# Patient Record
Sex: FEMALE | Race: WHITE | Employment: UNEMPLOYED | URBAN - METROPOLITAN AREA
[De-identification: names, ages, dates, MRNs, and addresses within clinical notes are randomized per-mention and may not be internally consistent; named-entity substitution may affect disease eponyms.]

---

## 2017-08-08 PROBLEM — Z94.4 LIVER REPLACED BY TRANSPLANT (HCC): Status: ACTIVE | Noted: 2017-08-08

## 2017-08-08 PROBLEM — E06.3 HASHIMOTO'S THYROIDITIS: Status: ACTIVE | Noted: 2017-08-08

## 2017-08-08 PROBLEM — Z29.8 NEED FOR PROPHYLACTIC IMMUNOTHERAPY: Status: ACTIVE | Noted: 2017-08-08

## 2018-10-18 ENCOUNTER — HOSPITAL ENCOUNTER (OUTPATIENT)
Dept: LAB | Age: 38
Discharge: HOME | End: 2018-10-18
Payer: MEDICARE

## 2018-10-18 LAB
ALBUMIN SERPL-MCNC: 3.8 G/DL (ref 3.5–5)
ALBUMIN/GLOB SERPL: 0.8 {RATIO} (ref 1.2–3.5)
ALP SERPL-CCNC: 169 U/L (ref 50–136)
ALT SERPL-CCNC: 149 U/L (ref 12–65)
ANION GAP SERPL CALC-SCNC: 9 MMOL/L (ref 7–16)
AST SERPL-CCNC: 344 U/L (ref 15–37)
BASOPHILS # BLD: 0 K/UL (ref 0–0.2)
BASOPHILS NFR BLD: 1 % (ref 0–2)
BILIRUB SERPL-MCNC: 2 MG/DL (ref 0.2–1.1)
BUN SERPL-MCNC: 13 MG/DL (ref 6–23)
CALCIUM SERPL-MCNC: 9.2 MG/DL (ref 8.3–10.4)
CHLORIDE SERPL-SCNC: 107 MMOL/L (ref 98–107)
CO2 SERPL-SCNC: 25 MMOL/L (ref 21–32)
CREAT SERPL-MCNC: 1.07 MG/DL (ref 0.6–1)
DIFFERENTIAL METHOD BLD: ABNORMAL
EOSINOPHIL # BLD: 0.2 K/UL (ref 0–0.8)
EOSINOPHIL NFR BLD: 2 % (ref 0.5–7.8)
ERYTHROCYTE [DISTWIDTH] IN BLOOD BY AUTOMATED COUNT: 13.8 %
GLOBULIN SER CALC-MCNC: 4.9 G/DL (ref 2.3–3.5)
GLUCOSE SERPL-MCNC: 95 MG/DL (ref 65–100)
HCT VFR BLD AUTO: 43.9 % (ref 35.8–46.3)
HGB BLD-MCNC: 14.2 G/DL (ref 11.7–15.4)
IMM GRANULOCYTES # BLD: 0 K/UL (ref 0–0.5)
IMM GRANULOCYTES NFR BLD AUTO: 0 % (ref 0–5)
INR PPP: 1.2
LYMPHOCYTES # BLD: 1.4 K/UL (ref 0.5–4.6)
LYMPHOCYTES NFR BLD: 22 % (ref 13–44)
MCH RBC QN AUTO: 36 PG (ref 26.1–32.9)
MCHC RBC AUTO-ENTMCNC: 32.3 G/DL (ref 31.4–35)
MCV RBC AUTO: 111.4 FL (ref 79.6–97.8)
MONOCYTES # BLD: 0.8 K/UL (ref 0.1–1.3)
MONOCYTES NFR BLD: 12 % (ref 4–12)
NEUTS SEG # BLD: 3.9 K/UL (ref 1.7–8.2)
NEUTS SEG NFR BLD: 62 % (ref 43–78)
NRBC # BLD: 0 K/UL (ref 0–0.2)
PLATELET # BLD AUTO: 158 K/UL (ref 150–450)
PMV BLD AUTO: 11.1 FL (ref 9.4–12.3)
POTASSIUM SERPL-SCNC: 4.5 MMOL/L (ref 3.5–5.1)
PROT SERPL-MCNC: 8.7 G/DL (ref 6.3–8.2)
PROTHROMBIN TIME: 14.3 SEC (ref 11.5–14.5)
RBC # BLD AUTO: 3.94 M/UL (ref 4.05–5.2)
SODIUM SERPL-SCNC: 141 MMOL/L (ref 136–145)
WBC # BLD AUTO: 6.3 K/UL (ref 4.3–11.1)

## 2018-10-18 PROCEDURE — 85025 COMPLETE CBC W/AUTO DIFF WBC: CPT

## 2018-10-18 PROCEDURE — 80197 ASSAY OF TACROLIMUS: CPT

## 2018-10-18 PROCEDURE — 80053 COMPREHEN METABOLIC PANEL: CPT

## 2018-10-18 PROCEDURE — 85610 PROTHROMBIN TIME: CPT

## 2018-10-18 PROCEDURE — 36415 COLL VENOUS BLD VENIPUNCTURE: CPT

## 2018-10-19 LAB — TACROLIMUS BLD-MCNC: 5.8 NG/ML (ref 2–20)

## 2019-10-16 ENCOUNTER — APPOINTMENT (OUTPATIENT)
Dept: GENERAL RADIOLOGY | Age: 39
End: 2019-10-16
Attending: EMERGENCY MEDICINE
Payer: MEDICARE

## 2019-10-16 ENCOUNTER — HOSPITAL ENCOUNTER (EMERGENCY)
Age: 39
Discharge: HOSPICE | End: 2019-10-16
Attending: EMERGENCY MEDICINE
Payer: MEDICARE

## 2019-10-16 ENCOUNTER — APPOINTMENT (OUTPATIENT)
Dept: CT IMAGING | Age: 39
End: 2019-10-16
Attending: EMERGENCY MEDICINE
Payer: MEDICARE

## 2019-10-16 ENCOUNTER — HOSPITAL ENCOUNTER (INPATIENT)
Age: 39
LOS: 3 days | Discharge: HOME | DRG: 314 | End: 2019-10-19
Attending: INTERNAL MEDICINE | Admitting: HOSPITALIST
Payer: MEDICARE

## 2019-10-16 VITALS
RESPIRATION RATE: 18 BRPM | BODY MASS INDEX: 31.39 KG/M2 | TEMPERATURE: 98.3 F | DIASTOLIC BLOOD PRESSURE: 57 MMHG | SYSTOLIC BLOOD PRESSURE: 95 MMHG | HEIGHT: 67 IN | OXYGEN SATURATION: 96 % | HEART RATE: 91 BPM | WEIGHT: 200 LBS

## 2019-10-16 DIAGNOSIS — D63.1 ANEMIA DUE TO CHRONIC KIDNEY DISEASE, ON CHRONIC DIALYSIS (HCC): ICD-10-CM

## 2019-10-16 DIAGNOSIS — J90 PLEURAL EFFUSION: ICD-10-CM

## 2019-10-16 DIAGNOSIS — R51.9 SEVERE HEADACHE: ICD-10-CM

## 2019-10-16 DIAGNOSIS — Z99.2 ANEMIA DUE TO CHRONIC KIDNEY DISEASE, ON CHRONIC DIALYSIS (HCC): ICD-10-CM

## 2019-10-16 DIAGNOSIS — N18.6 ANEMIA DUE TO CHRONIC KIDNEY DISEASE, ON CHRONIC DIALYSIS (HCC): ICD-10-CM

## 2019-10-16 DIAGNOSIS — I95.9 HYPOTENSION, UNSPECIFIED HYPOTENSION TYPE: Primary | ICD-10-CM

## 2019-10-16 PROBLEM — E16.2 HYPOGLYCEMIA: Status: ACTIVE | Noted: 2019-10-16

## 2019-10-16 LAB
ALBUMIN SERPL-MCNC: 3.4 G/DL (ref 3.5–5)
ALBUMIN/GLOB SERPL: 1.1 {RATIO} (ref 1.2–3.5)
ALP SERPL-CCNC: 173 U/L (ref 50–130)
ALT SERPL-CCNC: 14 U/L (ref 12–65)
ANION GAP SERPL CALC-SCNC: 12 MMOL/L (ref 7–16)
AST SERPL-CCNC: 38 U/L (ref 15–37)
ATRIAL RATE: 85 BPM
BASOPHILS # BLD: 0 K/UL (ref 0–0.2)
BASOPHILS NFR BLD: 1 % (ref 0–2)
BILIRUB SERPL-MCNC: 1.9 MG/DL (ref 0.2–1.1)
BUN SERPL-MCNC: 19 MG/DL (ref 6–23)
CALCIUM SERPL-MCNC: 8.9 MG/DL (ref 8.3–10.4)
CALCULATED P AXIS, ECG09: 40 DEGREES
CALCULATED R AXIS, ECG10: 63 DEGREES
CALCULATED T AXIS, ECG11: 60 DEGREES
CHLORIDE SERPL-SCNC: 98 MMOL/L (ref 98–107)
CO2 SERPL-SCNC: 25 MMOL/L (ref 21–32)
CREAT SERPL-MCNC: 5.66 MG/DL (ref 0.6–1)
DIAGNOSIS, 93000: NORMAL
DIFFERENTIAL METHOD BLD: ABNORMAL
EOSINOPHIL # BLD: 0 K/UL (ref 0–0.8)
EOSINOPHIL NFR BLD: 1 % (ref 0.5–7.8)
ERYTHROCYTE [DISTWIDTH] IN BLOOD BY AUTOMATED COUNT: 15.6 % (ref 11.9–14.6)
GLOBULIN SER CALC-MCNC: 3.1 G/DL (ref 2.3–3.5)
GLUCOSE BLD STRIP.AUTO-MCNC: 128 MG/DL (ref 65–100)
GLUCOSE BLD STRIP.AUTO-MCNC: 150 MG/DL (ref 65–100)
GLUCOSE BLD STRIP.AUTO-MCNC: 60 MG/DL (ref 65–100)
GLUCOSE SERPL-MCNC: 160 MG/DL (ref 65–100)
HCT VFR BLD AUTO: 26.8 % (ref 35.8–46.3)
HGB BLD-MCNC: 8 G/DL (ref 11.7–15.4)
IMM GRANULOCYTES # BLD AUTO: 0 K/UL (ref 0–0.5)
IMM GRANULOCYTES NFR BLD AUTO: 0 % (ref 0–5)
LIPASE SERPL-CCNC: 109 U/L (ref 73–393)
LYMPHOCYTES # BLD: 1.3 K/UL (ref 0.5–4.6)
LYMPHOCYTES NFR BLD: 41 % (ref 13–44)
MAGNESIUM SERPL-MCNC: 2.3 MG/DL (ref 1.8–2.4)
MCH RBC QN AUTO: 28 PG (ref 26.1–32.9)
MCHC RBC AUTO-ENTMCNC: 29.9 G/DL (ref 31.4–35)
MCV RBC AUTO: 93.7 FL (ref 79.6–97.8)
MONOCYTES # BLD: 0.2 K/UL (ref 0.1–1.3)
MONOCYTES NFR BLD: 7 % (ref 4–12)
NEUTS SEG # BLD: 1.5 K/UL (ref 1.7–8.2)
NEUTS SEG NFR BLD: 49 % (ref 43–78)
NRBC # BLD: 0 K/UL (ref 0–0.2)
P-R INTERVAL, ECG05: 140 MS
PHOSPHATE SERPL-MCNC: 4.5 MG/DL (ref 2.5–4.5)
PLATELET # BLD AUTO: 112 K/UL (ref 150–450)
PMV BLD AUTO: 12 FL (ref 9.4–12.3)
POTASSIUM SERPL-SCNC: 3.7 MMOL/L (ref 3.5–5.1)
PROT SERPL-MCNC: 6.5 G/DL (ref 6.3–8.2)
Q-T INTERVAL, ECG07: 434 MS
QRS DURATION, ECG06: 82 MS
QTC CALCULATION (BEZET), ECG08: 516 MS
RBC # BLD AUTO: 2.86 M/UL (ref 4.05–5.2)
SODIUM SERPL-SCNC: 135 MMOL/L (ref 136–145)
VENTRICULAR RATE, ECG03: 85 BPM
WBC # BLD AUTO: 3.1 K/UL (ref 4.3–11.1)

## 2019-10-16 PROCEDURE — 74011250637 HC RX REV CODE- 250/637: Performed by: EMERGENCY MEDICINE

## 2019-10-16 PROCEDURE — 99285 EMERGENCY DEPT VISIT HI MDM: CPT | Performed by: EMERGENCY MEDICINE

## 2019-10-16 PROCEDURE — 83735 ASSAY OF MAGNESIUM: CPT

## 2019-10-16 PROCEDURE — 82962 GLUCOSE BLOOD TEST: CPT

## 2019-10-16 PROCEDURE — 99285 EMERGENCY DEPT VISIT HI MDM: CPT

## 2019-10-16 PROCEDURE — 80053 COMPREHEN METABOLIC PANEL: CPT

## 2019-10-16 PROCEDURE — 83690 ASSAY OF LIPASE: CPT

## 2019-10-16 PROCEDURE — 96361 HYDRATE IV INFUSION ADD-ON: CPT | Performed by: EMERGENCY MEDICINE

## 2019-10-16 PROCEDURE — 70450 CT HEAD/BRAIN W/O DYE: CPT

## 2019-10-16 PROCEDURE — 74011000250 HC RX REV CODE- 250: Performed by: INTERNAL MEDICINE

## 2019-10-16 PROCEDURE — 77030020256 HC SOL INJ NACL 0.9%  500ML

## 2019-10-16 PROCEDURE — 85025 COMPLETE CBC W/AUTO DIFF WBC: CPT

## 2019-10-16 PROCEDURE — P9045 ALBUMIN (HUMAN), 5%, 250 ML: HCPCS | Performed by: HOSPITALIST

## 2019-10-16 PROCEDURE — 96374 THER/PROPH/DIAG INJ IV PUSH: CPT | Performed by: EMERGENCY MEDICINE

## 2019-10-16 PROCEDURE — 77030019605

## 2019-10-16 PROCEDURE — 74011250636 HC RX REV CODE- 250/636: Performed by: EMERGENCY MEDICINE

## 2019-10-16 PROCEDURE — 71045 X-RAY EXAM CHEST 1 VIEW: CPT

## 2019-10-16 PROCEDURE — 93005 ELECTROCARDIOGRAM TRACING: CPT | Performed by: EMERGENCY MEDICINE

## 2019-10-16 PROCEDURE — 84100 ASSAY OF PHOSPHORUS: CPT

## 2019-10-16 PROCEDURE — 74011250636 HC RX REV CODE- 250/636: Performed by: HOSPITALIST

## 2019-10-16 PROCEDURE — 74011250636 HC RX REV CODE- 250/636: Performed by: INTERNAL MEDICINE

## 2019-10-16 PROCEDURE — 74011250637 HC RX REV CODE- 250/637: Performed by: HOSPITALIST

## 2019-10-16 PROCEDURE — 65610000001 HC ROOM ICU GENERAL

## 2019-10-16 RX ORDER — MIDODRINE HYDROCHLORIDE 5 MG/1
10 TABLET ORAL
Status: DISCONTINUED | OUTPATIENT
Start: 2019-10-16 | End: 2019-10-19 | Stop reason: HOSPADM

## 2019-10-16 RX ORDER — MORPHINE SULFATE 2 MG/ML
2 INJECTION, SOLUTION INTRAMUSCULAR; INTRAVENOUS
Status: DISCONTINUED | OUTPATIENT
Start: 2019-10-16 | End: 2019-10-19 | Stop reason: HOSPADM

## 2019-10-16 RX ORDER — RANITIDINE 150 MG/1
150 TABLET, FILM COATED ORAL 2 TIMES DAILY
COMMUNITY
End: 2020-03-09

## 2019-10-16 RX ORDER — NALOXONE HYDROCHLORIDE 0.4 MG/ML
0.4 INJECTION, SOLUTION INTRAMUSCULAR; INTRAVENOUS; SUBCUTANEOUS AS NEEDED
Status: DISCONTINUED | OUTPATIENT
Start: 2019-10-16 | End: 2019-10-19 | Stop reason: HOSPADM

## 2019-10-16 RX ORDER — HYDROXYZINE PAMOATE 25 MG/1
25 CAPSULE ORAL
Status: DISCONTINUED | OUTPATIENT
Start: 2019-10-16 | End: 2019-10-19 | Stop reason: HOSPADM

## 2019-10-16 RX ORDER — SODIUM CHLORIDE 0.9 % (FLUSH) 0.9 %
5-40 SYRINGE (ML) INJECTION EVERY 8 HOURS
Status: DISCONTINUED | OUTPATIENT
Start: 2019-10-16 | End: 2019-10-19 | Stop reason: HOSPADM

## 2019-10-16 RX ORDER — ACETAMINOPHEN 325 MG/1
650 TABLET ORAL
Status: DISCONTINUED | OUTPATIENT
Start: 2019-10-16 | End: 2019-10-19 | Stop reason: HOSPADM

## 2019-10-16 RX ORDER — DEXTROSE 40 %
15 GEL (GRAM) ORAL AS NEEDED
Status: DISCONTINUED | OUTPATIENT
Start: 2019-10-16 | End: 2019-10-19 | Stop reason: HOSPADM

## 2019-10-16 RX ORDER — BISACODYL 5 MG
5 TABLET, DELAYED RELEASE (ENTERIC COATED) ORAL DAILY PRN
Status: DISCONTINUED | OUTPATIENT
Start: 2019-10-16 | End: 2019-10-19 | Stop reason: HOSPADM

## 2019-10-16 RX ORDER — ONDANSETRON 2 MG/ML
4 INJECTION INTRAMUSCULAR; INTRAVENOUS
Status: DISCONTINUED | OUTPATIENT
Start: 2019-10-16 | End: 2019-10-17

## 2019-10-16 RX ORDER — SODIUM CHLORIDE 0.9 % (FLUSH) 0.9 %
5-40 SYRINGE (ML) INJECTION AS NEEDED
Status: DISCONTINUED | OUTPATIENT
Start: 2019-10-16 | End: 2019-10-19 | Stop reason: HOSPADM

## 2019-10-16 RX ORDER — MIDODRINE HYDROCHLORIDE 5 MG/1
10 TABLET ORAL
Status: DISCONTINUED | OUTPATIENT
Start: 2019-10-16 | End: 2019-10-16 | Stop reason: HOSPADM

## 2019-10-16 RX ORDER — MYCOPHENOLATE MOFETIL 250 MG/1
500 CAPSULE ORAL
Status: DISCONTINUED | OUTPATIENT
Start: 2019-10-17 | End: 2019-10-19 | Stop reason: HOSPADM

## 2019-10-16 RX ORDER — MYCOPHENOLATE MOFETIL 500 MG/1
250 TABLET ORAL 2 TIMES DAILY
COMMUNITY

## 2019-10-16 RX ORDER — FAMOTIDINE 20 MG/1
20 TABLET, FILM COATED ORAL DAILY
Status: DISCONTINUED | OUTPATIENT
Start: 2019-10-17 | End: 2019-10-17

## 2019-10-16 RX ORDER — UREA 10 %
220 LOTION (ML) TOPICAL 2 TIMES DAILY
COMMUNITY

## 2019-10-16 RX ORDER — ACETAMINOPHEN 500 MG
1000 TABLET ORAL
Status: COMPLETED | OUTPATIENT
Start: 2019-10-16 | End: 2019-10-16

## 2019-10-16 RX ORDER — LEVOTHYROXINE SODIUM 175 UG/1
175 TABLET ORAL
COMMUNITY

## 2019-10-16 RX ORDER — ROPINIROLE 0.25 MG/1
0.25 TABLET, FILM COATED ORAL
Status: DISCONTINUED | OUTPATIENT
Start: 2019-10-16 | End: 2019-10-17

## 2019-10-16 RX ORDER — HEPARIN SODIUM 5000 [USP'U]/ML
5000 INJECTION, SOLUTION INTRAVENOUS; SUBCUTANEOUS EVERY 8 HOURS
Status: DISCONTINUED | OUTPATIENT
Start: 2019-10-16 | End: 2019-10-19

## 2019-10-16 RX ORDER — ONDANSETRON 2 MG/ML
4 INJECTION INTRAMUSCULAR; INTRAVENOUS
Status: COMPLETED | OUTPATIENT
Start: 2019-10-16 | End: 2019-10-16

## 2019-10-16 RX ORDER — TACROLIMUS 1 MG/1
3 CAPSULE ORAL DAILY
Status: DISCONTINUED | OUTPATIENT
Start: 2019-10-17 | End: 2019-10-19 | Stop reason: HOSPADM

## 2019-10-16 RX ORDER — ALBUMIN HUMAN 50 G/1000ML
25 SOLUTION INTRAVENOUS ONCE
Status: COMPLETED | OUTPATIENT
Start: 2019-10-16 | End: 2019-10-16

## 2019-10-16 RX ORDER — HYDROCODONE BITARTRATE AND ACETAMINOPHEN 5; 325 MG/1; MG/1
1 TABLET ORAL
Status: DISCONTINUED | OUTPATIENT
Start: 2019-10-16 | End: 2019-10-19 | Stop reason: HOSPADM

## 2019-10-16 RX ORDER — DEXTROSE 50 % IN WATER (D50W) INTRAVENOUS SYRINGE
25-50 AS NEEDED
Status: DISCONTINUED | OUTPATIENT
Start: 2019-10-16 | End: 2019-10-19 | Stop reason: HOSPADM

## 2019-10-16 RX ORDER — POTASSIUM CHLORIDE 750 MG/1
10 TABLET, FILM COATED, EXTENDED RELEASE ORAL DAILY
COMMUNITY

## 2019-10-16 RX ORDER — MIDODRINE HYDROCHLORIDE 10 MG/1
10 TABLET ORAL 3 TIMES DAILY
COMMUNITY

## 2019-10-16 RX ADMIN — ONDANSETRON 4 MG: 2 INJECTION INTRAMUSCULAR; INTRAVENOUS at 13:45

## 2019-10-16 RX ADMIN — ACETAMINOPHEN 1000 MG: 500 TABLET, FILM COATED ORAL at 16:16

## 2019-10-16 RX ADMIN — SODIUM CHLORIDE 1000 ML: 900 INJECTION, SOLUTION INTRAVENOUS at 13:48

## 2019-10-16 RX ADMIN — MIDODRINE HYDROCHLORIDE 10 MG: 5 TABLET ORAL at 20:08

## 2019-10-16 RX ADMIN — Medication 10 ML: at 21:36

## 2019-10-16 RX ADMIN — ROPINIROLE HYDROCHLORIDE 0.25 MG: 0.25 TABLET, FILM COATED ORAL at 21:56

## 2019-10-16 RX ADMIN — SODIUM CHLORIDE 1000 ML: 900 INJECTION, SOLUTION INTRAVENOUS at 23:59

## 2019-10-16 RX ADMIN — PROMETHAZINE HYDROCHLORIDE 12.5 MG: 25 INJECTION INTRAMUSCULAR; INTRAVENOUS at 21:06

## 2019-10-16 RX ADMIN — MIDODRINE HYDROCHLORIDE 10 MG: 5 TABLET ORAL at 14:53

## 2019-10-16 RX ADMIN — HEPARIN SODIUM 5000 UNITS: 5000 INJECTION INTRAVENOUS; SUBCUTANEOUS at 22:49

## 2019-10-16 RX ADMIN — ONDANSETRON 4 MG: 2 INJECTION INTRAMUSCULAR; INTRAVENOUS at 21:33

## 2019-10-16 RX ADMIN — SODIUM CHLORIDE 500 ML: 900 INJECTION, SOLUTION INTRAVENOUS at 21:36

## 2019-10-16 RX ADMIN — RIFAXIMIN 550 MG: 550 TABLET ORAL at 22:49

## 2019-10-16 RX ADMIN — ALBUMIN (HUMAN) 25 G: 12.5 INJECTION, SOLUTION INTRAVENOUS at 19:57

## 2019-10-16 NOTE — ED TRIAGE NOTES
Pt states she is SOB and vision felt like it was going away. Pt had a paracentesis this morning and had 7-8 liters removed. States vision is getting better but has a headache and still feeling SOB. States she has eaten lunch but not taken lunch time medication.

## 2019-10-16 NOTE — PROGRESS NOTES
Patient received to ICU and placed on monitors. Patient is alert and oriented and denies pain. Lungs sound clear, S1S2 auscultated, and bowel sounds active. Patient denies pain. Patient is refusing CHG bath and states she has no open wounds on body. Patient's VSS.

## 2019-10-16 NOTE — DISCHARGE SUMMARY
Hospitalist Note     Admit Date:  10/16/2019  1:13 PM   Name:  Feroz Dietz   Age:  34 y.o.  :  1980   MRN:  [id number]   PCP:  Moose Das DO  Treatment Team: Attending Provider: Ivan Velásquez MD; Primary Nurse: Victoria Dakins, RN    HPI/Subjective:   Chief complaint: nausea and headache    This is a 49-year-old unfortunate female with past medical history significant for cardiac arrest in 2019, history of Sharyon Yee status post liver transplantation in , hypothyroidism, chronic renal failure/end-stage renal disease on  hemodialysis presented to the ER for sudden onset of severe nausea and headache. Patient had paracentesis done today and had about 8 L of ascitic fluid removed. Later patient developed nausea and a severe headache with blurry vision. She has chronic hypertension and is currently on midodrine but on arrival to the ER blood pressure was very low in the 13X systolic. Her medical history is complicated after multiorgan failure status post cardiac arrest in 2019 and required ICU care was intubated for a long time. Since then she has been on dialysis. Patient denies any chest pain or shortness of breath at this time. She had palpitations earlier Accu-Chek shows glucose was less than 60. She had some orange juice and blood glucose improved. Patient was nauseous and vomited earlier after she took her rifaximin. She denies any weakness tingling or numbness of extremities. No fever or chills. At the time of my evaluation, dizziness and blurred vision has improved. 10 systems reviewed and negative except as noted in HPI. ER course:  Hypotensive with systolic blood pressure in the 70s. Patient received IV fluid bolus and blood pressure improved to 90 systolic which is almost at baseline. CT head was done and showed no acute intracranial abnormality.      Chest x-ray was done which shows probable acute asymmetric moderate pulmonary edema with a new moderate right pleural effusion and associated right basilar atelectasis and/or consolidation. Patient reports that she has right pleural effusion previously. Because of hypotension hypoglycemia, she will be admitted for further evaluation management. Patient needs dialysis and therefore will be transferred to [facility] and will need ICU level of care given her hypotension and hypoglycemia.   Past Medical History:   Diagnosis Date    Chronic kidney disease 2015    Coarctation of aorta, congenital     S/P repair    Hashimoto's thyroiditis 2017    Heart murmur     History of kidney stones     History of ovarian cyst     Liver transplanted ([address]     DALY (nonalcoholic steatohepatitis)     Obesity       Past Surgical History:   Procedure Laterality Date    CARDIAC SURG PROCEDURE UNLIST      coarctation of aorta as child with surgery    CYSTOSCOPY,INSERT URETERAL STENT      several kidney stones removed -- cysto    HX APPENDECTOMY          HX COLONOSCOPY      HX ENDOSCOPY      HX GASTRIC BYPASS      HX LAP CHOLECYSTECTOMY  2014    HX LIVER TRANSPLANT  2015    HX OTHER SURGICAL      liver biopsy, paracentensis      Allergies   Allergen Reactions    Sulfa (Sulfonamide Antibiotics) Hives     And itching    Tetracycline Shortness of Breath    Piperacillin-Tazobactam Hives     Swelling      Reglan [Metoclopramide] Other (comments)     Unusual gait    Toradol [Ketorolac] Hives and Other (comments)     Has had allergic rx before with this med-- but not every time       Social History     Tobacco Use    Smoking status: Former Smoker     Packs/day: 0.50     Years: 15.00     Pack years: 7.50     Types: Cigarettes     Last attempt to quit: 2015     Years since quittin.5    Smokeless tobacco: Never Used    Tobacco comment: 5-6 cigarettes a day   Substance Use Topics    Alcohol use: No     Alcohol/week: 0.0 standard drinks     Comment: occ      Family History Problem Relation Age of Onset    Delayed Awakening Father     Hypertension Father     Cancer Maternal Uncle         Leomyosarmia    Cancer Paternal Uncle         colon stage 2    Diabetes Maternal Grandmother     Diabetes Maternal Grandfather     No Known Problems Mother       Immunization History   Administered Date(s) Administered    Hep A Vaccine (Adult) 10/26/2016    Hep B Vaccine (Adult) 10/26/2016    Influenza Vaccine 10/01/2013, 10/23/2014, 11/01/2014, 10/03/2016    Influenza Vaccine (Quad) Intradermal PF 10/26/2016    Influenza Vaccine (Quad) PF 10/02/2017    Pneumococcal Conjugate (PCV-13) 09/27/2016    Pneumococcal Polysaccharide (PPSV-23) 12/30/2014    Tdap 09/08/2012, 08/30/2014     PTA Medications:  Prior to Admission Medications   Prescriptions Last Dose Informant Patient Reported? Taking? SODIUM BICARBONATE PO   Yes No   Sig: Take 10 mcg by mouth two (2) times a day. SUMAtriptan (IMITREX) 50 mg tablet   No No   Sig: Take 1 tab PO at onset of migraine with large glass of water. May repeat 2 hours later if needed. No more than 2 tabs in a day   levothyroxine (SYNTHROID) 200 mcg tablet   No No   Sig: Take 1 Tab by mouth Daily (before breakfast). Indications: hypothyroidism   levothyroxine (SYNTHROID) 25 mcg tablet   No No   Sig: Take 1 Tab by mouth Daily (before breakfast). With 200 mcg dose  Indications: hypothyroidism   mycophenolate sodium (MYFORTIC) 360 mg delayed release tablet   Yes No   Sig: Take 360 mg by mouth two (2) times a day. Indications: 4 tabs 2 times a day   tacrolimus (PROGRAF) 1 mg capsule   Yes No   Sig: Take 1 mg by mouth every twelve (12) hours.  Indications: 4 tabs in the AM and 3 tabs in the PM      Facility-Administered Medications: None       Objective:     Patient Vitals for the past 24 hrs:   Temp Pulse Resp BP SpO2   10/16/19 1706  91 18 95/57 96 %   10/16/19 1701  90  91/57 96 %   10/16/19 1656  88  (!) 88/54 98 %   10/16/19 1651  85  93/51 98 % 10/16/19 1646  86  95/54 97 %   10/16/19 1641  82  (!) 85/50 97 %   10/16/19 1636  82 17 (!) 86/51 97 %   10/16/19 1631  82  90/60 99 %   10/16/19 1626  82  (!) 89/55 97 %   10/16/19 1621  83  91/51 96 %   10/16/19 1616  82  96/59 98 %   10/16/19 1611  82  93/56 97 %   10/16/19 1610  86 22  98 %   10/16/19 1606  83  97/62 98 %   10/16/19 1601  84 20 97/59 96 %   10/16/19 1556  83  99/58 99 %   10/16/19 1551  87 16 99/60 99 %   10/16/19 1548  87  96/54 98 %   10/16/19 1536  85  (!) 88/54 96 %   10/16/19 1531  87  (!) 88/55 95 %   10/16/19 1526  86  92/54 96 %   10/16/19 1521  89  91/55 96 %   10/16/19 1516  89  (!) 88/54 97 %   10/16/19 1511  90  (!) 89/54 97 %   10/16/19 1506  92  (!) 86/51 96 %   10/16/19 1501  93  (!) 80/47 97 %   10/16/19 1455    (!) 67/41    10/16/19 1453  91  (!) 67/41    10/16/19 1351  84 20  97 %   10/16/19 1340    92/56 99 %   10/16/19 1316    91/54 100 %   10/16/19 1306 98.3 °F (36.8 °C) 99 18 (!) 78/39 99 %     Oxygen Therapy  O2 Sat (%): 96 % (10/16/19 1706)  Pulse via Oximetry: 92 beats per minute (10/16/19 1706)  O2 Device: Room air (10/16/19 1306)    No intake or output data in the 24 hours ending 10/16/19 1757    *Note that automatically entered I/Os may not be accurate; dependent on patient compliance with collection and accurate  by assistants. Physical Exam:  General:    Well nourished. Alert. Eyes:   Normal sclerae. Extraocular movements intact. HENT:  Normocephalic, atraumatic. Moist mucous membranes  CV:   RRR. No m/r/g. Lungs:    Diminished breath sounds right lung base, no wheezing, rhonchi, or rales. Abdomen: Soft, nontender, nondistended. Bowel sounds active no organomegaly no guarding no rigidity  Extremities: Warm and dry. No cyanosis or edema. Neurologic: CN II-XII grossly intact. Sensation intact. Skin:     No rashes or jaundice.   Normal coloration  Psych:  Normal mood and affect. I reviewed the labs, imaging, EKGs, telemetry, and other studies done this admission. Data Review:   Recent Results (from the past 24 hour(s))   CBC WITH AUTOMATED DIFF    Collection Time: 10/16/19  1:21 PM   Result Value Ref Range    WBC 3.1 (L) 4.3 - 11.1 K/uL    RBC 2.86 (L) 4.05 - 5.2 M/uL    HGB 8.0 (L) 11.7 - 15.4 g/dL    HCT 26.8 (L) 35.8 - 46.3 %    MCV 93.7 79.6 - 97.8 FL    MCH 28.0 26.1 - 32.9 PG    MCHC 29.9 (L) 31.4 - 35.0 g/dL    RDW 15.6 (H) 11.9 - 14.6 %    PLATELET 070 (L) 616 - 450 K/uL    MPV 12.0 9.4 - 12.3 FL    ABSOLUTE NRBC 0.00 0.0 - 0.2 K/uL    DF AUTOMATED      NEUTROPHILS 49 43 - 78 %    LYMPHOCYTES 41 13 - 44 %    MONOCYTES 7 4.0 - 12.0 %    EOSINOPHILS 1 0.5 - 7.8 %    BASOPHILS 1 0.0 - 2.0 %    IMMATURE GRANULOCYTES 0 0.0 - 5.0 %    ABS. NEUTROPHILS 1.5 (L) 1.7 - 8.2 K/UL    ABS. LYMPHOCYTES 1.3 0.5 - 4.6 K/UL    ABS. MONOCYTES 0.2 0.1 - 1.3 K/UL    ABS. EOSINOPHILS 0.0 0.0 - 0.8 K/UL    ABS. BASOPHILS 0.0 0.0 - 0.2 K/UL    ABS. IMM. GRANS. 0.0 0.0 - 0.5 K/UL   METABOLIC PANEL, COMPREHENSIVE    Collection Time: 10/16/19  1:21 PM   Result Value Ref Range    Sodium 135 (L) 136 - 145 mmol/L    Potassium 3.7 3.5 - 5.1 mmol/L    Chloride 98 98 - 107 mmol/L    CO2 25 21 - 32 mmol/L    Anion gap 12 7 - 16 mmol/L    Glucose 160 (H) 65 - 100 mg/dL    BUN 19 6 - 23 MG/DL    Creatinine 5.66 (H) 0.6 - 1.0 MG/DL    GFR est AA 11 (L) >60 ml/min/1.73m2    GFR est non-AA 9 (L) >60 ml/min/1.73m2    Calcium 8.9 8.3 - 10.4 MG/DL    Bilirubin, total 1.9 (H) 0.2 - 1.1 MG/DL    ALT (SGPT) 14 12 - 65 U/L    AST (SGOT) 38 (H) 15 - 37 U/L    Alk.  phosphatase 173 (H) 50 - 130 U/L    Protein, total 6.5 6.3 - 8.2 g/dL    Albumin 3.4 (L) 3.5 - 5.0 g/dL    Globulin 3.1 2.3 - 3.5 g/dL    A-G Ratio 1.1 (L) 1.2 - 3.5     LIPASE    Collection Time: 10/16/19  1:21 PM   Result Value Ref Range    Lipase 109 73 - 393 U/L   MAGNESIUM    Collection Time: 10/16/19  1:21 PM   Result Value Ref Range    Magnesium 2.3 1.8 - 2.4 mg/dL   PHOSPHORUS    Collection Time: 10/16/19  1:21 PM   Result Value Ref Range    Phosphorus 4.5 2.5 - 4.5 MG/DL   EKG, 12 LEAD, INITIAL    Collection Time: 10/16/19  1:47 PM   Result Value Ref Range    Ventricular Rate 85 BPM    Atrial Rate 85 BPM    P-R Interval 140 ms    QRS Duration 82 ms    Q-T Interval 434 ms    QTC Calculation (Bezet) 516 ms    Calculated P Axis 40 degrees    Calculated R Axis 63 degrees    Calculated T Axis 60 degrees    Diagnosis       Normal sinus rhythm  Nonspecific T wave abnormality  Prolonged QT  Abnormal ECG  When compared with ECG of 12-NOV-2015 15:47,  Non-specific change in ST segment in Anterior leads  T wave amplitude has decreased in Inferior leads  Nonspecific T wave abnormality now evident in Anterolateral leads  QT has lengthened  Confirmed by Grace Pederson MD (), ASHLY ESPARZA ([id number]) on 10/16/2019 2:15:35 PM     GLUCOSE, POC    Collection Time: 10/16/19  2:51 PM   Result Value Ref Range    Glucose (POC) 60 (L) 65 - 100 mg/dL   GLUCOSE, POC    Collection Time: 10/16/19  3:31 PM   Result Value Ref Range    Glucose (POC) 150 (H) 65 - 100 mg/dL       All Micro Results     None          Current Facility-Administered Medications   Medication Dose Route Frequency    midodrine (PROAMITINE) tablet 10 mg  10 mg Oral TID WITH MEALS     Current Outpatient Medications   Medication Sig    SUMAtriptan (IMITREX) 50 mg tablet Take 1 tab PO at onset of migraine with large glass of water. May repeat 2 hours later if needed. No more than 2 tabs in a day    levothyroxine (SYNTHROID) 25 mcg tablet Take 1 Tab by mouth Daily (before breakfast). With 200 mcg dose  Indications: hypothyroidism    levothyroxine (SYNTHROID) 200 mcg tablet Take 1 Tab by mouth Daily (before breakfast). Indications: hypothyroidism    tacrolimus (PROGRAF) 1 mg capsule Take 1 mg by mouth every twelve (12) hours.  Indications: 4 tabs in the AM and 3 tabs in the PM    mycophenolate sodium (MYFORTIC) 360 mg delayed release tablet Take 360 mg by mouth two (2) times a day. Indications: 4 tabs 2 times a day    SODIUM BICARBONATE PO Take 10 mcg by mouth two (2) times a day. Other Studies:  Ct Head Wo Cont    Result Date: 10/16/2019  Examination: CT scan of the brain without contrast. History: Severe headache, 38 years Female  Pt states she is SOB and vision felt like it was going away. Pt had a paracentesis this morning and had 7-8 liters removed. States vision is getting better but has a headache and still feeling SOB Technique: 5 mm axial imaging of the brain from the posterior fossa to the vertex. All CT scans at this location are performed using dose modulation techniques as appropriate to a performed exam including the following: automated exposure control; adjustment of the mA and/or kV according to patient's size (this includes techniques or standardized protocols for targeted exams where dose is matched to indication/reason for exam; i.e. extremities or head); use of iterative reconstruction technique. Comparison:  CT brain October 21, 2010 Findings: The brain parenchyma appears within normal limits for age. The ventricles, sulci are age-appropriate. No intracranial hemorrhage or extra-axial collection is identified. No evidence of acute infarct. No mass effect or midline shift is present. Basal cisterns are intact. The visualized paranasal sinuses and mastoid air cells are clear. The orbits, bones, and soft tissues are normal in appearance. Impression:  Normal unenhanced CT of the brain for age. No acute intracranial abnormality. Xr Chest Port    Result Date: 10/16/2019  AP chest radiograph History: dyspnea, 38 years Female Comparison: Chest radiograph July 07, 2015 Findings:  Interval placement of a right subclavian approach central venous dialysis catheter, which appears to terminate in the right atrium. Partially obscured cardiomediastinal silhouette. Persistent low lung volumes.   New moderate right pleural effusion with associated right basilar atelectasis and or consolidation. There is probable also acute moderate asymmetric pulmonary edema. No evidence of pneumothorax. Visualized soft tissue and osseous structures otherwise unremarkable. Impression:  Probable acute asymmetric moderate pulmonary edema with a new moderate right pleural effusion and associated right basilar atelectasis and or consolidation. Assessment and Plan:     Hospital Problems as of 10/16/2019 Date Reviewed: 2/27/2018          Codes Class Noted - Resolved POA    Hypotension ICD-10-CM: I95.9  ICD-9-CM: 458.9  10/16/2019 - Present Unknown        Hashimoto's thyroiditis ICD-10-CM: E06.3  ICD-9-CM: 245.2  8/8/2017 - Present Yes        Liver replaced by transplant [facility]) ICD-10-CM: Z94.4  ICD-9-CM: V42.7  8/8/2017 - Present Yes        DALY (nonalcoholic steatohepatitis) (Chronic) ICD-10-CM: K75.81  ICD-9-CM: 571.8  10/19/2014 - Present Yes        Hypothyroidism (Chronic) ICD-10-CM: E03.9  ICD-9-CM: 244.9  9/16/2014 - Present Yes              Plan: This is a 70-year-old female with    Symptomatic hypotension status post paracentesis  Blood pressure improved after IV hydration with IV fluids. Albumin infusion. Continue Midodrin  Dizziness and blurred vision could be related to hypotension, hypoglycemia. CT head negative for acute intracranial abnormality. No focal neurological deficits.     Symptomatic hypoglycemia  Accu-Cheks q. one hour until blood glucoses improved    Hypothyroidism  Continue levothyroxine    Liver failure status post transplantation  Continue transplant medications    End-stage renal disease on hemodialysis  Consult nephrology for maintenance hemodialysis    Discharge planning: Patient will be admitted to ICU at [facility].   Patient meets inpatient criteria given symptomatic hypotension, hypoglycemia and multiple comorbidities including liver failure status post transplantation, end-stage renal disease on hemodialysis. Anticipate hospital stay for at least 48 hours.      DVT ppx: Heparin, SCDs  Code status:  Full  D POA patient's mother Ms. Cori Islas phone number [phone]  Estimated LOS:  Greater than 2 midnights  Risk:  high    Signed:  Fabien Tellez MD

## 2019-10-16 NOTE — ED NOTES
TRANSFER - OUT REPORT:    Verbal report given to Wen(name) on Winston Pimentel  being transferred to [address] [place] [address] at [facility]) for routine progression of care       Report consisted of patients Situation, Background, Assessment and   Recommendations(SBAR). Information from the following report(s) SBAR was reviewed with the receiving nurse. Lines:   Peripheral IV 10/16/19 Left Antecubital (Active)   Site Assessment Clean, dry, & intact 10/16/2019  1:24 PM   Phlebitis Assessment 0 10/16/2019  1:24 PM   Infiltration Assessment 0 10/16/2019  1:24 PM        Opportunity for questions and clarification was provided.       Patient transported with:   Monitor  O2 @ 2 liters

## 2019-10-16 NOTE — H&P
Hospitalist Note     Admit Date:  10/16/2019  1:13 PM   Name:  Mari Millan   Age:  34 y.o.  :  1980   MRN:  [id number]   PCP:  Corina Castle DO  Treatment Team: Attending Provider: Wilmar Feldman MD; Primary Nurse: Milad Luis RN    HPI/Subjective:   Chief complaint: nausea and headache    This is a 73-year-old unfortunate female with past medical history significant for cardiac arrest in 2019, history of [place] status post liver transplantation in , hypothyroidism, chronic renal failure/end-stage renal disease on  hemodialysis presented to the ER for sudden onset of severe nausea and headache. Patient had paracentesis done today and had about 8 L of ascitic fluid removed. Later patient developed nausea and a severe headache with blurry vision. She has chronic hypertension and is currently on midodrine but on arrival to the ER blood pressure was very low in the 11B systolic. Her medical history is complicated after multiorgan failure status post cardiac arrest in 2019 and required ICU care was intubated for a long time. Since then she has been on dialysis. Patient denies any chest pain or shortness of breath at this time. She had palpitations earlier Accu-Chek shows glucose was less than 60. She had some orange juice and blood glucose improved. Patient was nauseous and vomited earlier after she took her rifaximin. She denies any weakness tingling or numbness of extremities. No fever or chills. At the time of my evaluation, dizziness and blurred vision has improved. 10 systems reviewed and negative except as noted in HPI. ER course:  Hypotensive with systolic blood pressure in the 70s. Patient received IV fluid bolus and blood pressure improved to 90 systolic which is almost at baseline. CT head was done and showed no acute intracranial abnormality.      Chest x-ray was done which shows probable acute asymmetric moderate pulmonary edema with a new moderate right pleural effusion and associated right basilar atelectasis and/or consolidation. Patient reports that she has right pleural effusion previously. Because of hypotension hypoglycemia, she will be admitted for further evaluation management. Patient needs dialysis and therefore will be transferred to [facility] and will need ICU level of care given her hypotension and hypoglycemia.   Past Medical History:   Diagnosis Date    Chronic kidney disease 2015    Coarctation of aorta, congenital     S/P repair    Hashimoto's thyroiditis 2017    Heart murmur     History of kidney stones     History of ovarian cyst     Liver transplanted ([address]     DALY (nonalcoholic steatohepatitis)     Obesity       Past Surgical History:   Procedure Laterality Date    CARDIAC SURG PROCEDURE UNLIST      coarctation of aorta as child with surgery    CYSTOSCOPY,INSERT URETERAL STENT      several kidney stones removed -- cysto    HX APPENDECTOMY          HX COLONOSCOPY      HX ENDOSCOPY      HX GASTRIC BYPASS      HX LAP CHOLECYSTECTOMY  2014    HX LIVER TRANSPLANT  2015    HX OTHER SURGICAL      liver biopsy, paracentensis      Allergies   Allergen Reactions    Sulfa (Sulfonamide Antibiotics) Hives     And itching    Tetracycline Shortness of Breath    Piperacillin-Tazobactam Hives     Swelling      Reglan [Metoclopramide] Other (comments)     Unusual gait    Toradol [Ketorolac] Hives and Other (comments)     Has had allergic rx before with this med-- but not every time       Social History     Tobacco Use    Smoking status: Former Smoker     Packs/day: 0.50     Years: 15.00     Pack years: 7.50     Types: Cigarettes     Last attempt to quit: 2015     Years since quittin.5    Smokeless tobacco: Never Used    Tobacco comment: 5-6 cigarettes a day   Substance Use Topics    Alcohol use: No     Alcohol/week: 0.0 standard drinks     Comment: occ      Family History Problem Relation Age of Onset    Delayed Awakening Father     Hypertension Father     Cancer Maternal Uncle         Leomyosarmia    Cancer Paternal Uncle         colon stage 2    Diabetes Maternal Grandmother     Diabetes Maternal Grandfather     No Known Problems Mother       Immunization History   Administered Date(s) Administered    Hep A Vaccine (Adult) 10/26/2016    Hep B Vaccine (Adult) 10/26/2016    Influenza Vaccine 10/01/2013, 10/23/2014, 11/01/2014, 10/03/2016    Influenza Vaccine (Quad) Intradermal PF 10/26/2016    Influenza Vaccine (Quad) PF 10/02/2017    Pneumococcal Conjugate (PCV-13) 09/27/2016    Pneumococcal Polysaccharide (PPSV-23) 12/30/2014    Tdap 09/08/2012, 08/30/2014     PTA Medications:  Prior to Admission Medications   Prescriptions Last Dose Informant Patient Reported? Taking? SODIUM BICARBONATE PO   Yes No   Sig: Take 10 mcg by mouth two (2) times a day. SUMAtriptan (IMITREX) 50 mg tablet   No No   Sig: Take 1 tab PO at onset of migraine with large glass of water. May repeat 2 hours later if needed. No more than 2 tabs in a day   levothyroxine (SYNTHROID) 200 mcg tablet   No No   Sig: Take 1 Tab by mouth Daily (before breakfast). Indications: hypothyroidism   levothyroxine (SYNTHROID) 25 mcg tablet   No No   Sig: Take 1 Tab by mouth Daily (before breakfast). With 200 mcg dose  Indications: hypothyroidism   mycophenolate sodium (MYFORTIC) 360 mg delayed release tablet   Yes No   Sig: Take 360 mg by mouth two (2) times a day. Indications: 4 tabs 2 times a day   tacrolimus (PROGRAF) 1 mg capsule   Yes No   Sig: Take 1 mg by mouth every twelve (12) hours.  Indications: 4 tabs in the AM and 3 tabs in the PM      Facility-Administered Medications: None       Objective:     Patient Vitals for the past 24 hrs:   Temp Pulse Resp BP SpO2   10/16/19 1506  92  (!) 86/51 96 %   10/16/19 1501  93  (!) 80/47 97 %   10/16/19 1455    (!) 67/41    10/16/19 1453  91  (!) 67/41    10/16/19 1351  84 20  97 %   10/16/19 1340    92/56 99 %   10/16/19 1316    91/54 100 %   10/16/19 1306 98.3 °F (36.8 °C) 99 18 (!) 78/39 99 %     Oxygen Therapy  O2 Sat (%): 96 % (10/16/19 1506)  Pulse via Oximetry: 92 beats per minute (10/16/19 1506)  O2 Device: Room air (10/16/19 1306)    No intake or output data in the 24 hours ending 10/16/19 1707    *Note that automatically entered I/Os may not be accurate; dependent on patient compliance with collection and accurate  by assistants. Physical Exam:  General:    Well nourished. Alert. Eyes:   Normal sclerae. Extraocular movements intact. HENT:  Normocephalic, atraumatic. Moist mucous membranes  CV:   RRR. No m/r/g. Lungs:    Diminished breath sounds right lung base, no wheezing, rhonchi, or rales. Abdomen: Soft, nontender, nondistended. Bowel sounds active no organomegaly no guarding no rigidity  Extremities: Warm and dry. No cyanosis or edema. Neurologic: CN II-XII grossly intact. Sensation intact. Skin:     No rashes or jaundice. Normal coloration  Psych:  Normal mood and affect. I reviewed the labs, imaging, EKGs, telemetry, and other studies done this admission. Data Review:   Recent Results (from the past 24 hour(s))   CBC WITH AUTOMATED DIFF    Collection Time: 10/16/19  1:21 PM   Result Value Ref Range    WBC 3.1 (L) 4.3 - 11.1 K/uL    RBC 2.86 (L) 4.05 - 5.2 M/uL    HGB 8.0 (L) 11.7 - 15.4 g/dL    HCT 26.8 (L) 35.8 - 46.3 %    MCV 93.7 79.6 - 97.8 FL    MCH 28.0 26.1 - 32.9 PG    MCHC 29.9 (L) 31.4 - 35.0 g/dL    RDW 15.6 (H) 11.9 - 14.6 %    PLATELET 096 (L) 800 - 450 K/uL    MPV 12.0 9.4 - 12.3 FL    ABSOLUTE NRBC 0.00 0.0 - 0.2 K/uL    DF AUTOMATED      NEUTROPHILS 49 43 - 78 %    LYMPHOCYTES 41 13 - 44 %    MONOCYTES 7 4.0 - 12.0 %    EOSINOPHILS 1 0.5 - 7.8 %    BASOPHILS 1 0.0 - 2.0 %    IMMATURE GRANULOCYTES 0 0.0 - 5.0 %    ABS. NEUTROPHILS 1.5 (L) 1.7 - 8.2 K/UL    ABS.  LYMPHOCYTES 1.3 0.5 - 4.6 K/UL    ABS. MONOCYTES 0.2 0.1 - 1.3 K/UL    ABS. EOSINOPHILS 0.0 0.0 - 0.8 K/UL    ABS. BASOPHILS 0.0 0.0 - 0.2 K/UL    ABS. IMM. GRANS. 0.0 0.0 - 0.5 K/UL   METABOLIC PANEL, COMPREHENSIVE    Collection Time: 10/16/19  1:21 PM   Result Value Ref Range    Sodium 135 (L) 136 - 145 mmol/L    Potassium 3.7 3.5 - 5.1 mmol/L    Chloride 98 98 - 107 mmol/L    CO2 25 21 - 32 mmol/L    Anion gap 12 7 - 16 mmol/L    Glucose 160 (H) 65 - 100 mg/dL    BUN 19 6 - 23 MG/DL    Creatinine 5.66 (H) 0.6 - 1.0 MG/DL    GFR est AA 11 (L) >60 ml/min/1.73m2    GFR est non-AA 9 (L) >60 ml/min/1.73m2    Calcium 8.9 8.3 - 10.4 MG/DL    Bilirubin, total 1.9 (H) 0.2 - 1.1 MG/DL    ALT (SGPT) 14 12 - 65 U/L    AST (SGOT) 38 (H) 15 - 37 U/L    Alk.  phosphatase 173 (H) 50 - 130 U/L    Protein, total 6.5 6.3 - 8.2 g/dL    Albumin 3.4 (L) 3.5 - 5.0 g/dL    Globulin 3.1 2.3 - 3.5 g/dL    A-G Ratio 1.1 (L) 1.2 - 3.5     LIPASE    Collection Time: 10/16/19  1:21 PM   Result Value Ref Range    Lipase 109 73 - 393 U/L   MAGNESIUM    Collection Time: 10/16/19  1:21 PM   Result Value Ref Range    Magnesium 2.3 1.8 - 2.4 mg/dL   PHOSPHORUS    Collection Time: 10/16/19  1:21 PM   Result Value Ref Range    Phosphorus 4.5 2.5 - 4.5 MG/DL   EKG, 12 LEAD, INITIAL    Collection Time: 10/16/19  1:47 PM   Result Value Ref Range    Ventricular Rate 85 BPM    Atrial Rate 85 BPM    P-R Interval 140 ms    QRS Duration 82 ms    Q-T Interval 434 ms    QTC Calculation (Bezet) 516 ms    Calculated P Axis 40 degrees    Calculated R Axis 63 degrees    Calculated T Axis 60 degrees    Diagnosis       Normal sinus rhythm  Nonspecific T wave abnormality  Prolonged QT  Abnormal ECG  When compared with ECG of 12-NOV-2015 15:47,  Non-specific change in ST segment in Anterior leads  T wave amplitude has decreased in Inferior leads  Nonspecific T wave abnormality now evident in Anterolateral leads  QT has lengthened  Confirmed by Reynaldo Foster MD (), ASHLY ESPARZA ([id number]) on 10/16/2019 2:15:35 PM     GLUCOSE, POC    Collection Time: 10/16/19  2:51 PM   Result Value Ref Range    Glucose (POC) 60 (L) 65 - 100 mg/dL   GLUCOSE, POC    Collection Time: 10/16/19  3:31 PM   Result Value Ref Range    Glucose (POC) 150 (H) 65 - 100 mg/dL       All Micro Results     None          Current Facility-Administered Medications   Medication Dose Route Frequency    midodrine (PROAMITINE) tablet 10 mg  10 mg Oral TID WITH MEALS     Current Outpatient Medications   Medication Sig    SUMAtriptan (IMITREX) 50 mg tablet Take 1 tab PO at onset of migraine with large glass of water. May repeat 2 hours later if needed. No more than 2 tabs in a day    levothyroxine (SYNTHROID) 25 mcg tablet Take 1 Tab by mouth Daily (before breakfast). With 200 mcg dose  Indications: hypothyroidism    levothyroxine (SYNTHROID) 200 mcg tablet Take 1 Tab by mouth Daily (before breakfast). Indications: hypothyroidism    tacrolimus (PROGRAF) 1 mg capsule Take 1 mg by mouth every twelve (12) hours. Indications: 4 tabs in the AM and 3 tabs in the PM    mycophenolate sodium (MYFORTIC) 360 mg delayed release tablet Take 360 mg by mouth two (2) times a day. Indications: 4 tabs 2 times a day    SODIUM BICARBONATE PO Take 10 mcg by mouth two (2) times a day. Other Studies:  Ct Head Wo Cont    Result Date: 10/16/2019  Examination: CT scan of the brain without contrast. History: Severe headache, 38 years Female  Pt states she is SOB and vision felt like it was going away. Pt had a paracentesis this morning and had 7-8 liters removed. States vision is getting better but has a headache and still feeling SOB Technique: 5 mm axial imaging of the brain from the posterior fossa to the vertex.   All CT scans at this location are performed using dose modulation techniques as appropriate to a performed exam including the following: automated exposure control; adjustment of the mA and/or kV according to patient's size (this includes techniques or standardized protocols for targeted exams where dose is matched to indication/reason for exam; i.e. extremities or head); use of iterative reconstruction technique. Comparison:  CT brain October 21, 2010 Findings: The brain parenchyma appears within normal limits for age. The ventricles, sulci are age-appropriate. No intracranial hemorrhage or extra-axial collection is identified. No evidence of acute infarct. No mass effect or midline shift is present. Basal cisterns are intact. The visualized paranasal sinuses and mastoid air cells are clear. The orbits, bones, and soft tissues are normal in appearance. Impression:  Normal unenhanced CT of the brain for age. No acute intracranial abnormality. Xr Chest Port    Result Date: 10/16/2019  AP chest radiograph History: dyspnea, 38 years Female Comparison: Chest radiograph July 07, 2015 Findings:  Interval placement of a right subclavian approach central venous dialysis catheter, which appears to terminate in the right atrium. Partially obscured cardiomediastinal silhouette. Persistent low lung volumes. New moderate right pleural effusion with associated right basilar atelectasis and or consolidation. There is probable also acute moderate asymmetric pulmonary edema. No evidence of pneumothorax. Visualized soft tissue and osseous structures otherwise unremarkable. Impression:  Probable acute asymmetric moderate pulmonary edema with a new moderate right pleural effusion and associated right basilar atelectasis and or consolidation.        Assessment and Plan:     Hospital Problems as of 10/16/2019 Date Reviewed: 2/27/2018          Codes Class Noted - Resolved POA    Hashimoto's thyroiditis ICD-10-CM: E06.3  ICD-9-CM: 245.2  8/8/2017 - Present Yes        Liver replaced by transplant [facility]) ICD-10-CM: Z94.4  ICD-9-CM: V42.7  8/8/2017 - Present Yes        DALY (nonalcoholic steatohepatitis) (Chronic) ICD-10-CM: K75.81  ICD-9-CM: 571.8  10/19/2014 - Present Yes        Hypothyroidism (Chronic) ICD-10-CM: E03.9  ICD-9-CM: 244.9  9/16/2014 - Present Yes              Plan: This is a 49-year-old female with    Symptomatic hypotension status post paracentesis  Blood pressure improved after IV hydration with IV fluids. Albumin infusion. Continue Midodrin  Dizziness and blurred vision could be related to hypotension, hypoglycemia. CT head negative for acute intracranial abnormality. No focal neurological deficits. Symptomatic hypoglycemia  Accu-Cheks q. one hour until blood glucoses improved    Hypothyroidism  Continue levothyroxine    Liver failure status post transplantation  Continue transplant medications    End-stage renal disease on hemodialysis  Consult nephrology for maintenance hemodialysis    Discharge planning: Patient will be admitted to ICU at [facility].   Patient meets inpatient criteria given symptomatic hypotension, hypoglycemia and multiple comorbidities including liver failure status post transplantation, end-stage renal disease on hemodialysis. Anticipate hospital stay for at least 48 hours.      DVT ppx: Heparin, SCDs  Code status:  Full  D POA patient's mother Ms. Barry Tello phone number [phone]  Estimated LOS:  Greater than 2 midnights  Risk:  high    Signed:  Betsy Villela MD

## 2019-10-16 NOTE — ED NOTES
Patient blood sugar <60. She was given 2 4oz containers of juice. Pt mother was giving her starburst candy and went to get her a soda from the vending machine.

## 2019-10-16 NOTE — PROGRESS NOTES
TRANSFER - IN REPORT:    Verbal report received from Connie(name) on [employer]  being received from [place] [facility](unit) for routine progression of care      Report consisted of patients Situation, Background, Assessment and   Recommendations(SBAR). Information from the following report(s) ED Summary was reviewed with the receiving nurse. Opportunity for questions and clarification was provided. Assessment completed upon patients arrival to unit and care assumed.

## 2019-10-16 NOTE — ED PROVIDER NOTES
29-year-old female presents to the ER with complaints of sudden onset of severe nausea, headache. Patient underwent paracentesis earlier today. Reports having about 8 L removed. After eating lunch she developed severe nausea noted her vision to be fading and had a severe headache  Patient does have a history of hypotension and is currently on Midrin    Patient's past medical history is significant for liver failure with liver transplant in 2015. Patient is currently on dialysis for chronic renal failure. She dialyzes at [facility] and is followed by [place] [facility]. Patient has a history of multisystem organ failure with prolonged intubation and ICU care. It was after this episode that she never regained her kidney function    Patient does complain of some generalized abdominal pain today as well. She states this is similar to the discomfort she has after undergoing paracentesis. The history is provided by the patient and a parent (Significant portion of the history is provided by the patient's mother at the request of the patient. ). Shortness of Breath   Associated symptoms include abdominal pain. Pertinent negatives include no fever, no headaches, no rhinorrhea, no ear pain, no neck pain, no cough, no chest pain, no vomiting and no rash. Nausea    This is a recurrent problem. The current episode started less than 1 hour ago. The problem occurs 2 to 4 times per day. The problem has not changed since onset. The emesis has an appearance of stomach contents. There has been no fever. Associated symptoms include chills and abdominal pain. Pertinent negatives include no fever, no diarrhea, no headaches, no arthralgias, no myalgias, no cough and no headaches. Past medical history comments: Hepatic failure status post transplant. Gastric bypass. Renal failure/ chronic ascites .         Past Medical History:   Diagnosis Date    Chronic kidney disease 4/20/2015    Coarctation of aorta, congenital     S/P repair    Hashimoto's thyroiditis 2017    Heart murmur     History of kidney stones     History of ovarian cyst     Liver transplanted ([facility] [address]     DALY (nonalcoholic steatohepatitis)     Obesity        Past Surgical History:   Procedure Laterality Date    CARDIAC SURG PROCEDURE UNLIST      coarctation of aorta as child with surgery    CYSTOSCOPY,INSERT URETERAL STENT      several kidney stones removed -- cysto    HX APPENDECTOMY          HX COLONOSCOPY      HX ENDOSCOPY      HX GASTRIC BYPASS      HX LAP CHOLECYSTECTOMY  2014    HX LIVER TRANSPLANT  2015    HX OTHER SURGICAL      liver biopsy, paracentensis         Family History:   Problem Relation Age of Onset    Delayed Awakening Father     Hypertension Father     Cancer Maternal Uncle         Leomyosarmia    Cancer Paternal Uncle         colon stage 2    Diabetes Maternal Grandmother     Diabetes Maternal Grandfather     No Known Problems Mother        Social History     Socioeconomic History    Marital status: LEGALLY      Spouse name: Not on file    Number of children: Not on file    Years of education: Not on file    Highest education level: Not on file   Occupational History    Not on file   Social Needs    Financial resource strain: Not on file    Food insecurity:     Worry: Not on file     Inability: Not on file    Transportation needs:     Medical: Not on file     Non-medical: Not on file   Tobacco Use    Smoking status: Former Smoker     Packs/day: 0.50     Years: 15.00     Pack years: 7.50     Types: Cigarettes     Last attempt to quit: 2015     Years since quittin.5    Smokeless tobacco: Never Used    Tobacco comment: 5-6 cigarettes a day   Substance and Sexual Activity    Alcohol use: No     Alcohol/week: 0.0 standard drinks     Comment: occ    Drug use: No    Sexual activity: Yes     Partners: Male   Lifestyle    Physical activity:     Days per week: Not on file Minutes per session: Not on file    Stress: Not on file   Relationships    Social connections:     Talks on phone: Not on file     Gets together: Not on file     Attends [employer] service: Not on file     Active member of club or organization: Not on file     Attends meetings of clubs or organizations: Not on file     Relationship status: Not on file    Intimate partner violence:     Fear of current or ex partner: Not on file     Emotionally abused: Not on file     Physically abused: Not on file     Forced sexual activity: Not on file   Other Topics Concern    Not on file   Social History Narrative    9/16/14:  PATIENT IS , HAS NO CHILDREN. WORKS HERE AT [address] [place] [address].      8/8/817:  Pt is currently  from her . ALLERGIES: Sulfa (sulfonamide antibiotics); Tetracycline; Piperacillin-tazobactam; Reglan [metoclopramide]; and Toradol [ketorolac]    Review of Systems   Constitutional: Positive for chills. Negative for fever. HENT: Negative for congestion, ear pain and rhinorrhea. Eyes: Negative for photophobia and discharge. Respiratory: Positive for shortness of breath. Negative for cough. Cardiovascular: Negative for chest pain and palpitations. Gastrointestinal: Positive for abdominal pain and nausea. Negative for constipation, diarrhea and vomiting. Endocrine: Negative for cold intolerance and heat intolerance. Genitourinary: Negative for dysuria and flank pain. Musculoskeletal: Negative for arthralgias, myalgias and neck pain. Skin: Negative for rash and wound. Allergic/Immunologic: Negative for environmental allergies and food allergies. Neurological: Negative for syncope and headaches. Hematological: Negative for adenopathy. Does not bruise/bleed easily. Psychiatric/Behavioral: Positive for dysphoric mood. The patient is nervous/anxious. All other systems reviewed and are negative.       Vitals:    10/16/19 1306 10/16/19 1316   BP: (!) 78/39 91/54 Pulse: 99    Resp: 18    Temp: 98.3 °F (36.8 °C)    SpO2: 99% 100%   Weight: 90.7 kg (200 lb)    Height: 5' 7\" (1.702 m)             Physical Exam   Constitutional: She is oriented to person, place, and time. She appears well-developed and well-nourished. She appears distressed. HENT:   Head: Normocephalic and atraumatic. Right Ear: External ear normal.   Left Ear: External ear normal.   Mouth/Throat: Oropharynx is clear and moist. No oropharyngeal exudate. Eyes: Pupils are equal, round, and reactive to light. Conjunctivae and EOM are normal.   Neck: Normal range of motion. Neck supple. No JVD present. Cardiovascular: Regular rhythm, normal heart sounds and intact distal pulses. Tachycardia present. PMI is not displaced. Exam reveals no gallop and no friction rub. No murmur heard. Pulmonary/Chest: Effort normal and breath sounds normal.       Abdominal: Soft. Normal appearance and bowel sounds are normal. She exhibits no distension and no mass. There is no hepatosplenomegaly. There is tenderness. Musculoskeletal: Normal range of motion. She exhibits no edema or deformity. Neurological: She is alert and oriented to person, place, and time. She has normal strength. No cranial nerve deficit or sensory deficit. She displays a negative Romberg sign. Gait normal.   Skin: Skin is warm and dry. Capillary refill takes less than 2 seconds. No rash noted. Psychiatric: Her speech is normal and behavior is normal. Judgment and thought content normal. Her mood appears anxious. Cognition and memory are normal.   Nursing note and vitals reviewed.        MDM  Number of Diagnoses or Management Options  Anemia due to chronic kidney disease, on chronic dialysis [facility]): new and requires workup  Hypotension, unspecified hypotension type: new and requires workup  Pleural effusion: new and requires workup  Severe headache: new and requires workup  Diagnosis management comments: EKG reviewed  Sinus rhythm normal axis  Nonspecific T wave flattening  No ectopy  QT is prolonged at 434 ms  No acute ischemic changes    4:06 PM  Patient findings discussed with the liver transplant team at [facility]  They feel that there is no specific reason that the patient would need their care versus general medical care. Discussed findings with patient and her mother. We discussed options for inpatient care. Family initially requested [facility] admission however when I called the transfer center they had no inpatient beds available. After further discussion, the decision was made for the patient to stay here at [address] [place] [id number]. I have discussed the case with hospitalist service. They will see the patient and arrange transfer. Amount and/or Complexity of Data Reviewed  Clinical lab tests: ordered and reviewed  Tests in the radiology section of CPT®: ordered and reviewed  Tests in the medicine section of CPT®: ordered and reviewed  Review and summarize past medical records: yes  Discuss the patient with other providers: yes  Independent visualization of images, tracings, or specimens: yes    Risk of Complications, Morbidity, and/or Mortality  Presenting problems: high  Diagnostic procedures: high  Management options: high  General comments: Elements of this note have been dictated via voice recognition software. Text and phrases may be limited by the accuracy of the software. The chart has been reviewed, but errors may still be present.       Critical Care  Total time providing critical care: 30-74 minutes    Patient Progress  Patient progress: improved         Procedures

## 2019-10-16 NOTE — H&P
Hospitalist Note     Admit Date:  No admission date for patient encounter. Name:  Kaushal Stallings   Age:  34 y.o.  :  1980   MRN:  [id number]   PCP:  Cindy Davis DO  Treatment Team: Attending Provider: Clarissa Thomas DO    HPI/Subjective:   Chief complaint: nausea and headache    This is a 42-year-old unfortunate female with past medical history significant for cardiac arrest in 2019, history of Susan Bora status post liver transplantation in , hypothyroidism, chronic renal failure/end-stage renal disease on  hemodialysis presented to the ER for sudden onset of severe nausea and headache. Patient had paracentesis done today and had about 8 L of ascitic fluid removed. Later patient developed nausea and a severe headache with blurry vision. She has chronic hypertension and is currently on midodrine but on arrival to the ER blood pressure was very low in the 29E systolic. Her medical history is complicated after multiorgan failure status post cardiac arrest in 2019 and required ICU care was intubated for a long time. Since then she has been on dialysis. Patient denies any chest pain or shortness of breath at this time. She had palpitations earlier Accu-Chek shows glucose was less than 60. She had some orange juice and blood glucose improved. Patient was nauseous and vomited earlier after she took her rifaximin. She denies any weakness tingling or numbness of extremities. No fever or chills. At the time of my evaluation, dizziness and blurred vision has improved. 10 systems reviewed and negative except as noted in HPI. ER course:  Hypotensive with systolic blood pressure in the 70s. Patient received IV fluid bolus and blood pressure improved to 90 systolic which is almost at baseline. CT head was done and showed no acute intracranial abnormality.      Chest x-ray was done which shows probable acute asymmetric moderate pulmonary edema with a new moderate right pleural effusion and associated right basilar atelectasis and/or consolidation. Patient reports that she has right pleural effusion previously. Because of hypotension hypoglycemia, she will be admitted for further evaluation management. Patient needs dialysis and therefore will be transferred to [facility] and will need ICU level of care given her hypotension and hypoglycemia.   Past Medical History:   Diagnosis Date    Chronic kidney disease 2015    Coarctation of aorta, congenital     S/P repair    Hashimoto's thyroiditis 2017    Heart murmur     History of kidney stones     History of ovarian cyst     Liver transplanted ([address]     DALY (nonalcoholic steatohepatitis)     Obesity       Past Surgical History:   Procedure Laterality Date    CARDIAC SURG PROCEDURE UNLIST      coarctation of aorta as child with surgery    CYSTOSCOPY,INSERT URETERAL STENT      several kidney stones removed -- cysto    HX APPENDECTOMY          HX COLONOSCOPY      HX ENDOSCOPY      HX GASTRIC BYPASS      HX LAP CHOLECYSTECTOMY  2014    HX LIVER TRANSPLANT  2015    HX OTHER SURGICAL      liver biopsy, paracentensis      Allergies   Allergen Reactions    Sulfa (Sulfonamide Antibiotics) Hives     And itching    Tetracycline Shortness of Breath    Piperacillin-Tazobactam Hives     Swelling      Reglan [Metoclopramide] Other (comments)     Unusual gait    Toradol [Ketorolac] Hives and Other (comments)     Has had allergic rx before with this med-- but not every time       Social History     Tobacco Use    Smoking status: Former Smoker     Packs/day: 0.50     Years: 15.00     Pack years: 7.50     Types: Cigarettes     Last attempt to quit: 2015     Years since quittin.5    Smokeless tobacco: Never Used    Tobacco comment: 5-6 cigarettes a day   Substance Use Topics    Alcohol use: No     Alcohol/week: 0.0 standard drinks     Comment: occ      Family History   Problem Relation Age of Onset    Delayed Awakening Father     Hypertension Father     Cancer Maternal Uncle         Leomyosarmia    Cancer Paternal Uncle         colon stage 2    Diabetes Maternal Grandmother     Diabetes Maternal Grandfather     No Known Problems Mother       Immunization History   Administered Date(s) Administered    Hep A Vaccine (Adult) 10/26/2016    Hep B Vaccine (Adult) 10/26/2016    Influenza Vaccine 10/01/2013, 10/23/2014, 11/01/2014, 10/03/2016    Influenza Vaccine (Quad) Intradermal PF 10/26/2016    Influenza Vaccine (Quad) PF 10/02/2017    Pneumococcal Conjugate (PCV-13) 09/27/2016    Pneumococcal Polysaccharide (PPSV-23) 12/30/2014    Tdap 09/08/2012, 08/30/2014     PTA Medications:  Prior to Admission Medications   Prescriptions Last Dose Informant Patient Reported? Taking? SODIUM BICARBONATE PO   Yes No   Sig: Take 10 mcg by mouth two (2) times a day. SUMAtriptan (IMITREX) 50 mg tablet   No No   Sig: Take 1 tab PO at onset of migraine with large glass of water. May repeat 2 hours later if needed. No more than 2 tabs in a day   levothyroxine (SYNTHROID) 200 mcg tablet   No No   Sig: Take 1 Tab by mouth Daily (before breakfast). Indications: hypothyroidism   levothyroxine (SYNTHROID) 25 mcg tablet   No No   Sig: Take 1 Tab by mouth Daily (before breakfast). With 200 mcg dose  Indications: hypothyroidism   mycophenolate sodium (MYFORTIC) 360 mg delayed release tablet   Yes No   Sig: Take 360 mg by mouth two (2) times a day. Indications: 4 tabs 2 times a day   tacrolimus (PROGRAF) 1 mg capsule   Yes No   Sig: Take 1 mg by mouth every twelve (12) hours. Indications: 4 tabs in the AM and 3 tabs in the PM      Facility-Administered Medications: None       Objective:     No data found.          Intake/Output Summary (Last 24 hours) at 10/16/2019 1804  Last data filed at 10/16/2019 1448  Gross per 24 hour   Intake 1000 ml   Output --   Net 1000 ml       *Note that automatically entered I/Os may not be accurate; dependent on patient compliance with collection and accurate  by assistants. Physical Exam:  General:    Well nourished. Alert. Eyes:   Normal sclerae. Extraocular movements intact. HENT:  Normocephalic, atraumatic. Moist mucous membranes  CV:   RRR. No m/r/g. Lungs:    Diminished breath sounds right lung base, no wheezing, rhonchi, or rales. Abdomen: Soft, nontender, nondistended. Bowel sounds active no organomegaly no guarding no rigidity  Extremities: Warm and dry. No cyanosis or edema. Neurologic: CN II-XII grossly intact. Sensation intact. Skin:     No rashes or jaundice. Normal coloration  Psych:  Normal mood and affect. I reviewed the labs, imaging, EKGs, telemetry, and other studies done this admission. Data Review:   Recent Results (from the past 24 hour(s))   CBC WITH AUTOMATED DIFF    Collection Time: 10/16/19  1:21 PM   Result Value Ref Range    WBC 3.1 (L) 4.3 - 11.1 K/uL    RBC 2.86 (L) 4.05 - 5.2 M/uL    HGB 8.0 (L) 11.7 - 15.4 g/dL    HCT 26.8 (L) 35.8 - 46.3 %    MCV 93.7 79.6 - 97.8 FL    MCH 28.0 26.1 - 32.9 PG    MCHC 29.9 (L) 31.4 - 35.0 g/dL    RDW 15.6 (H) 11.9 - 14.6 %    PLATELET 534 (L) 460 - 450 K/uL    MPV 12.0 9.4 - 12.3 FL    ABSOLUTE NRBC 0.00 0.0 - 0.2 K/uL    DF AUTOMATED      NEUTROPHILS 49 43 - 78 %    LYMPHOCYTES 41 13 - 44 %    MONOCYTES 7 4.0 - 12.0 %    EOSINOPHILS 1 0.5 - 7.8 %    BASOPHILS 1 0.0 - 2.0 %    IMMATURE GRANULOCYTES 0 0.0 - 5.0 %    ABS. NEUTROPHILS 1.5 (L) 1.7 - 8.2 K/UL    ABS. LYMPHOCYTES 1.3 0.5 - 4.6 K/UL    ABS. MONOCYTES 0.2 0.1 - 1.3 K/UL    ABS. EOSINOPHILS 0.0 0.0 - 0.8 K/UL    ABS. BASOPHILS 0.0 0.0 - 0.2 K/UL    ABS. IMM.  GRANS. 0.0 0.0 - 0.5 K/UL   METABOLIC PANEL, COMPREHENSIVE    Collection Time: 10/16/19  1:21 PM   Result Value Ref Range    Sodium 135 (L) 136 - 145 mmol/L    Potassium 3.7 3.5 - 5.1 mmol/L    Chloride 98 98 - 107 mmol/L    CO2 25 21 - 32 mmol/L    Anion gap 12 7 - 16 mmol/L    Glucose 160 (H) 65 - 100 mg/dL    BUN 19 6 - 23 MG/DL    Creatinine 5.66 (H) 0.6 - 1.0 MG/DL    GFR est AA 11 (L) >60 ml/min/1.73m2    GFR est non-AA 9 (L) >60 ml/min/1.73m2    Calcium 8.9 8.3 - 10.4 MG/DL    Bilirubin, total 1.9 (H) 0.2 - 1.1 MG/DL    ALT (SGPT) 14 12 - 65 U/L    AST (SGOT) 38 (H) 15 - 37 U/L    Alk.  phosphatase 173 (H) 50 - 130 U/L    Protein, total 6.5 6.3 - 8.2 g/dL    Albumin 3.4 (L) 3.5 - 5.0 g/dL    Globulin 3.1 2.3 - 3.5 g/dL    A-G Ratio 1.1 (L) 1.2 - 3.5     LIPASE    Collection Time: 10/16/19  1:21 PM   Result Value Ref Range    Lipase 109 73 - 393 U/L   MAGNESIUM    Collection Time: 10/16/19  1:21 PM   Result Value Ref Range    Magnesium 2.3 1.8 - 2.4 mg/dL   PHOSPHORUS    Collection Time: 10/16/19  1:21 PM   Result Value Ref Range    Phosphorus 4.5 2.5 - 4.5 MG/DL   EKG, 12 LEAD, INITIAL    Collection Time: 10/16/19  1:47 PM   Result Value Ref Range    Ventricular Rate 85 BPM    Atrial Rate 85 BPM    P-R Interval 140 ms    QRS Duration 82 ms    Q-T Interval 434 ms    QTC Calculation (Bezet) 516 ms    Calculated P Axis 40 degrees    Calculated R Axis 63 degrees    Calculated T Axis 60 degrees    Diagnosis       Normal sinus rhythm  Nonspecific T wave abnormality  Prolonged QT  Abnormal ECG  When compared with ECG of 12-NOV-2015 15:47,  Non-specific change in ST segment in Anterior leads  T wave amplitude has decreased in Inferior leads  Nonspecific T wave abnormality now evident in Anterolateral leads  QT has lengthened  Confirmed by Reynaldo Foster MD (), ASHLY ESPARZA ([id number]) on 10/16/2019 2:15:35 PM     GLUCOSE, POC    Collection Time: 10/16/19  2:51 PM   Result Value Ref Range    Glucose (POC) 60 (L) 65 - 100 mg/dL   GLUCOSE, POC    Collection Time: 10/16/19  3:31 PM   Result Value Ref Range    Glucose (POC) 150 (H) 65 - 100 mg/dL   GLUCOSE, POC    Collection Time: 10/16/19  5:49 PM   Result Value Ref Range    Glucose (POC) 128 (H) 65 - 100 mg/dL       All Micro Results None          No current facility-administered medications for this encounter. Current Outpatient Medications   Medication Sig    SUMAtriptan (IMITREX) 50 mg tablet Take 1 tab PO at onset of migraine with large glass of water. May repeat 2 hours later if needed. No more than 2 tabs in a day    levothyroxine (SYNTHROID) 25 mcg tablet Take 1 Tab by mouth Daily (before breakfast). With 200 mcg dose  Indications: hypothyroidism    levothyroxine (SYNTHROID) 200 mcg tablet Take 1 Tab by mouth Daily (before breakfast). Indications: hypothyroidism    tacrolimus (PROGRAF) 1 mg capsule Take 1 mg by mouth every twelve (12) hours. Indications: 4 tabs in the AM and 3 tabs in the PM    mycophenolate sodium (MYFORTIC) 360 mg delayed release tablet Take 360 mg by mouth two (2) times a day. Indications: 4 tabs 2 times a day    SODIUM BICARBONATE PO Take 10 mcg by mouth two (2) times a day. Facility-Administered Medications Ordered in Other Encounters   Medication Dose Route Frequency    midodrine (PROAMITINE) tablet 10 mg  10 mg Oral TID WITH MEALS       Other Studies:  Ct Head Wo Cont    Result Date: 10/16/2019  Examination: CT scan of the brain without contrast. History: Severe headache, 38 years Female  Pt states she is SOB and vision felt like it was going away. Pt had a paracentesis this morning and had 7-8 liters removed. States vision is getting better but has a headache and still feeling SOB Technique: 5 mm axial imaging of the brain from the posterior fossa to the vertex. All CT scans at this location are performed using dose modulation techniques as appropriate to a performed exam including the following: automated exposure control; adjustment of the mA and/or kV according to patient's size (this includes techniques or standardized protocols for targeted exams where dose is matched to indication/reason for exam; i.e. extremities or head); use of iterative reconstruction technique.  Comparison:  CT brain October 21, 2010 Findings: The brain parenchyma appears within normal limits for age. The ventricles, sulci are age-appropriate. No intracranial hemorrhage or extra-axial collection is identified. No evidence of acute infarct. No mass effect or midline shift is present. Basal cisterns are intact. The visualized paranasal sinuses and mastoid air cells are clear. The orbits, bones, and soft tissues are normal in appearance. Impression:  Normal unenhanced CT of the brain for age. No acute intracranial abnormality. Xr Chest Port    Result Date: 10/16/2019  AP chest radiograph History: dyspnea, 38 years Female Comparison: Chest radiograph July 07, 2015 Findings:  Interval placement of a right subclavian approach central venous dialysis catheter, which appears to terminate in the right atrium. Partially obscured cardiomediastinal silhouette. Persistent low lung volumes. New moderate right pleural effusion with associated right basilar atelectasis and or consolidation. There is probable also acute moderate asymmetric pulmonary edema. No evidence of pneumothorax. Visualized soft tissue and osseous structures otherwise unremarkable. Impression:  Probable acute asymmetric moderate pulmonary edema with a new moderate right pleural effusion and associated right basilar atelectasis and or consolidation. Assessment and Plan:     Hospital Problems as of 10/16/2019 Date Reviewed: 2/27/2018    None          Plan: This is a 78-year-old female with    Symptomatic hypotension status post paracentesis  Blood pressure improved after IV hydration with IV fluids. Albumin infusion. Continue Midodrin  Dizziness and blurred vision could be related to hypotension, hypoglycemia. CT head negative for acute intracranial abnormality. No focal neurological deficits. Symptomatic hypoglycemia  Accu-Cheks q. one hour until blood glucoses improved  Hypoglycemic protocol.      Hypothyroidism  Continue levothyroxine    Liver failure status post transplantation  Continue transplant medications    End-stage renal disease on hemodialysis  Consult nephrology for maintenance hemodialysis    Discharge planning: Patient will be admitted to ICU at [facility].   Patient meets inpatient criteria given symptomatic hypotension, hypoglycemia and multiple comorbidities including liver failure status post transplantation, end-stage renal disease on hemodialysis. Anticipate hospital stay for at least 48 hours.      DVT ppx: Heparin, SCDs  Code status:  Full  D POA patient's mother Ms. Master Torrez phone number [phone]  Estimated LOS:  Greater than 2 midnights  Risk:  high    Signed:  Day Vasquez MD

## 2019-10-17 LAB
ANION GAP SERPL CALC-SCNC: 8 MMOL/L (ref 7–16)
BASOPHILS # BLD: 0 K/UL (ref 0–0.2)
BASOPHILS NFR BLD: 1 % (ref 0–2)
BUN SERPL-MCNC: 20 MG/DL (ref 6–23)
CALCIUM SERPL-MCNC: 8.5 MG/DL (ref 8.3–10.4)
CHLORIDE SERPL-SCNC: 106 MMOL/L (ref 98–107)
CO2 SERPL-SCNC: 23 MMOL/L (ref 21–32)
CREAT SERPL-MCNC: 5.86 MG/DL (ref 0.6–1)
DIFFERENTIAL METHOD BLD: ABNORMAL
EOSINOPHIL # BLD: 0.1 K/UL (ref 0–0.8)
EOSINOPHIL NFR BLD: 2 % (ref 0.5–7.8)
ERYTHROCYTE [DISTWIDTH] IN BLOOD BY AUTOMATED COUNT: 15.8 % (ref 11.9–14.6)
FERRITIN SERPL-MCNC: 167 NG/ML (ref 8–388)
FOLATE SERPL-MCNC: 15.2 NG/ML (ref 3.1–17.5)
GLUCOSE BLD STRIP.AUTO-MCNC: 127 MG/DL (ref 65–100)
GLUCOSE BLD STRIP.AUTO-MCNC: 145 MG/DL (ref 65–100)
GLUCOSE BLD STRIP.AUTO-MCNC: 61 MG/DL (ref 65–100)
GLUCOSE SERPL-MCNC: 96 MG/DL (ref 65–100)
HCT VFR BLD AUTO: 24.7 % (ref 35.8–46.3)
HGB BLD-MCNC: 7.6 G/DL (ref 11.7–15.4)
IMM GRANULOCYTES # BLD AUTO: 0 K/UL (ref 0–0.5)
IMM GRANULOCYTES NFR BLD AUTO: 1 % (ref 0–5)
IRON SATN MFR SERPL: 25 %
IRON SERPL-MCNC: 23 UG/DL (ref 35–150)
LYMPHOCYTES # BLD: 1.4 K/UL (ref 0.5–4.6)
LYMPHOCYTES NFR BLD: 36 % (ref 13–44)
MAGNESIUM SERPL-MCNC: 2.4 MG/DL (ref 1.8–2.4)
MCH RBC QN AUTO: 28.8 PG (ref 26.1–32.9)
MCHC RBC AUTO-ENTMCNC: 30.8 G/DL (ref 31.4–35)
MCV RBC AUTO: 93.6 FL (ref 79.6–97.8)
MONOCYTES # BLD: 0.5 K/UL (ref 0.1–1.3)
MONOCYTES NFR BLD: 13 % (ref 4–12)
NEUTS SEG # BLD: 1.9 K/UL (ref 1.7–8.2)
NEUTS SEG NFR BLD: 47 % (ref 43–78)
NRBC # BLD: 0 K/UL (ref 0–0.2)
PLATELET # BLD AUTO: 120 K/UL (ref 150–450)
PLATELET COMMENTS,PCOM: SLIGHT
PMV BLD AUTO: 10.6 FL (ref 9.4–12.3)
POTASSIUM SERPL-SCNC: 3.8 MMOL/L (ref 3.5–5.1)
RBC # BLD AUTO: 2.64 M/UL (ref 4.05–5.2)
RBC MORPH BLD: ABNORMAL
SODIUM SERPL-SCNC: 137 MMOL/L (ref 136–145)
TIBC SERPL-MCNC: 91 UG/DL (ref 250–450)
VIT B12 SERPL-MCNC: 386 PG/ML (ref 193–986)
WBC # BLD AUTO: 3.9 K/UL (ref 4.3–11.1)
WBC MORPH BLD: ABNORMAL

## 2019-10-17 PROCEDURE — 87329 GIARDIA AG IA: CPT

## 2019-10-17 PROCEDURE — 74011250637 HC RX REV CODE- 250/637: Performed by: HOSPITALIST

## 2019-10-17 PROCEDURE — 74011250637 HC RX REV CODE- 250/637: Performed by: INTERNAL MEDICINE

## 2019-10-17 PROCEDURE — 87177 OVA AND PARASITES SMEARS: CPT

## 2019-10-17 PROCEDURE — 85025 COMPLETE CBC W/AUTO DIFF WBC: CPT

## 2019-10-17 PROCEDURE — 83540 ASSAY OF IRON: CPT

## 2019-10-17 PROCEDURE — 87045 FECES CULTURE AEROBIC BACT: CPT

## 2019-10-17 PROCEDURE — 0107U C DIFF TOX AG DETCJ IA STOOL: CPT

## 2019-10-17 PROCEDURE — 82962 GLUCOSE BLOOD TEST: CPT

## 2019-10-17 PROCEDURE — 74011000250 HC RX REV CODE- 250: Performed by: INTERNAL MEDICINE

## 2019-10-17 PROCEDURE — 74011250636 HC RX REV CODE- 250/636: Performed by: INTERNAL MEDICINE

## 2019-10-17 PROCEDURE — 87040 BLOOD CULTURE FOR BACTERIA: CPT

## 2019-10-17 PROCEDURE — 80048 BASIC METABOLIC PNL TOTAL CA: CPT

## 2019-10-17 PROCEDURE — 36415 COLL VENOUS BLD VENIPUNCTURE: CPT

## 2019-10-17 PROCEDURE — 80197 ASSAY OF TACROLIMUS: CPT

## 2019-10-17 PROCEDURE — 82728 ASSAY OF FERRITIN: CPT

## 2019-10-17 PROCEDURE — 82746 ASSAY OF FOLIC ACID SERUM: CPT

## 2019-10-17 PROCEDURE — 65660000000 HC RM CCU STEPDOWN

## 2019-10-17 PROCEDURE — 83735 ASSAY OF MAGNESIUM: CPT

## 2019-10-17 PROCEDURE — 82607 VITAMIN B-12: CPT

## 2019-10-17 PROCEDURE — 74011250636 HC RX REV CODE- 250/636: Performed by: HOSPITALIST

## 2019-10-17 RX ORDER — ROPINIROLE 0.25 MG/1
0.5 TABLET, FILM COATED ORAL
Status: DISCONTINUED | OUTPATIENT
Start: 2019-10-17 | End: 2019-10-19 | Stop reason: HOSPADM

## 2019-10-17 RX ORDER — FAMOTIDINE 20 MG/1
20 TABLET, FILM COATED ORAL EVERY EVENING
Status: DISCONTINUED | OUTPATIENT
Start: 2019-10-17 | End: 2019-10-17

## 2019-10-17 RX ORDER — FAMOTIDINE 20 MG/1
20 TABLET, FILM COATED ORAL
Status: DISCONTINUED | OUTPATIENT
Start: 2019-10-17 | End: 2019-10-19 | Stop reason: HOSPADM

## 2019-10-17 RX ORDER — LORAZEPAM 0.5 MG/1
0.5 TABLET ORAL
Status: DISCONTINUED | OUTPATIENT
Start: 2019-10-17 | End: 2019-10-19 | Stop reason: HOSPADM

## 2019-10-17 RX ORDER — LIDOCAINE AND PRILOCAINE 25; 25 MG/G; MG/G
CREAM TOPICAL AS NEEDED
Status: DISCONTINUED | OUTPATIENT
Start: 2019-10-17 | End: 2019-10-19 | Stop reason: HOSPADM

## 2019-10-17 RX ADMIN — Medication 10 ML: at 13:40

## 2019-10-17 RX ADMIN — MIDODRINE HYDROCHLORIDE 10 MG: 5 TABLET ORAL at 13:35

## 2019-10-17 RX ADMIN — ACETAMINOPHEN 650 MG: 325 TABLET, FILM COATED ORAL at 22:21

## 2019-10-17 RX ADMIN — MYCOPHENOLATE MOFETIL 500 MG: 250 CAPSULE ORAL at 08:53

## 2019-10-17 RX ADMIN — MIDODRINE HYDROCHLORIDE 10 MG: 5 TABLET ORAL at 08:53

## 2019-10-17 RX ADMIN — LORAZEPAM 0.5 MG: 0.5 TABLET ORAL at 20:34

## 2019-10-17 RX ADMIN — LEVOTHYROXINE SODIUM 175 MCG: 100 TABLET ORAL at 06:19

## 2019-10-17 RX ADMIN — RIFAXIMIN 550 MG: 550 TABLET ORAL at 17:23

## 2019-10-17 RX ADMIN — TACROLIMUS 3 MG: 1 CAPSULE ORAL at 08:53

## 2019-10-17 RX ADMIN — HEPARIN SODIUM 5000 UNITS: 5000 INJECTION INTRAVENOUS; SUBCUTANEOUS at 14:14

## 2019-10-17 RX ADMIN — LIDOCAINE AND PRILOCAINE: 25; 25 CREAM TOPICAL at 17:41

## 2019-10-17 RX ADMIN — RIFAXIMIN 550 MG: 550 TABLET ORAL at 08:53

## 2019-10-17 RX ADMIN — MYCOPHENOLATE MOFETIL 500 MG: 250 CAPSULE ORAL at 19:33

## 2019-10-17 RX ADMIN — Medication 10 ML: at 06:18

## 2019-10-17 RX ADMIN — ROPINIROLE HYDROCHLORIDE 0.5 MG: 0.25 TABLET, FILM COATED ORAL at 22:01

## 2019-10-17 RX ADMIN — Medication 10 ML: at 20:08

## 2019-10-17 RX ADMIN — PROMETHAZINE HYDROCHLORIDE 12.5 MG: 25 INJECTION INTRAMUSCULAR; INTRAVENOUS at 10:50

## 2019-10-17 RX ADMIN — MIDODRINE HYDROCHLORIDE 10 MG: 5 TABLET ORAL at 19:33

## 2019-10-17 RX ADMIN — PROMETHAZINE HYDROCHLORIDE 12.5 MG: 25 INJECTION INTRAMUSCULAR; INTRAVENOUS at 20:04

## 2019-10-17 RX ADMIN — HEPARIN SODIUM 5000 UNITS: 5000 INJECTION INTRAVENOUS; SUBCUTANEOUS at 06:19

## 2019-10-17 RX ADMIN — LORAZEPAM 0.5 MG: 0.5 TABLET ORAL at 14:13

## 2019-10-17 NOTE — PROGRESS NOTES
Paged MD Ernandez about patients MAPs 64-60 on the last two blood pressure checks orders received to watch pressure and repage if it continues to drop.

## 2019-10-17 NOTE — PROGRESS NOTES
Hospitalist Note     Admit Date:  10/16/2019  6:31 PM   Name:  Martínez Oliver   Age:  34 y.o.  :  1980   MRN:  [id number]   PCP:  Radha Marks MD  Treatment Team: Attending Provider: Rosa Damon DO; Consulting Provider: Eric Obando MD; Utilization Review: Shantel Martinez RN; Care Manager: Alan Hutchins; Consulting Provider: Marium March MD    HPI/Subjective:       Ms. Lesley Golden is a 35 yo female with PMH of liver transplant for DALY cirrhosis followed by [facility], ESRD on dialysis, cardiac arrest admitted with hypotension, nausea, emesis. She is due to see [facility] in 2 months. She had outpatient paracentesis at [facility] on 10-16-19 removing 8 L of fluid. She was doing well after her procedure until she had acute onset of confusion, nausea, emesis, headache. She was hypotensive upon ED arrival and received IVF boluses and albumin. She did have albumin post paracentesis as well. She takes midodrine chronically. She additionally had hypoglycemia that has improved. CT head negative, CXR shows moderate pleural effusion and atelectasis/ consolidation. She thinks she recalls having a prior effusion when at [facility]. Nephrology to continue with HD needs.      Discharge plans pending to home         10-17-19 mother present, some nausea and loose BM due to her chronic meds, no dyspnea or cough, no chest pain,       Objective:     Patient Vitals for the past 24 hrs:   Temp Pulse Resp BP SpO2   10/17/19 1335 -- 96 -- 93/50 --   10/17/19 1214 -- 85 17 95/58 94 %   10/17/19 1158 -- 88 16 97/58 95 %   10/17/19 1143 -- 92 21 99/60 95 %   10/17/19 1128 -- 100 -- 105/61 96 %   10/17/19 1118 -- (!) 104 -- 115/57 95 %   10/17/19 1028 -- 93 21 100/66 96 %   10/17/19 1014 -- 91 -- 101/65 96 %   10/17/19 0958 -- 87 23 98/58 96 %   10/17/19 0943 -- 82 17 (!) 88/53 94 %   10/17/19 0928 -- 82 23 (!) 84/49 92 %   10/17/19 0914 -- 82 24 93/50 92 %   10/17/19 0858 -- 87 -- (!) 78/59 93 % 10/17/19 0853 -- 84 -- (!) 73/43 --   10/17/19 0843 -- 81 15 (!) 73/43 95 %   10/17/19 0828 -- 81 -- (!) 74/44 93 %   10/17/19 0813 -- 83 9 (!) 79/47 94 %   10/17/19 0758 -- 85 15 (!) 85/53 94 %   10/17/19 0743 98.4 °F (36.9 °C) 90 21 (!) 86/51 (!) 89 %   10/17/19 0728 -- 83 8 (!) 74/41 93 %   10/17/19 0713 -- 83 (!) 6 (!) 83/44 93 %   10/17/19 0658 -- 84 13 (!) 86/45 93 %   10/17/19 0600 -- 85 21 96/54 94 %   10/17/19 0528 -- 81 16 91/55 94 %   10/17/19 0458 -- 83 16 97/54 94 %   10/17/19 0358 97.7 °F (36.5 °C) 80 16 90/55 94 %   10/17/19 0343 -- 79 18 (!) 87/56 94 %   10/17/19 0328 -- 79 22 90/51 94 %   10/17/19 0314 -- 80 17 (!) 80/52 94 %   10/17/19 0258 -- 81 19 (!) 83/54 95 %   10/17/19 0214 -- 76 20 (!) 85/52 94 %   10/17/19 0158 -- 75 18 95/57 96 %   10/17/19 0143 -- 78 17 90/53 94 %   10/17/19 0113 -- 83 18 (!) 86/52 94 %   10/17/19 0043 -- 87 21 91/55 94 %   10/17/19 0028 -- 89 22 95/51 96 %   10/17/19 0013 -- 84 (!) 34 (!) 82/48 96 %   10/16/19 2300 98 °F (36.7 °C) 82 23 97/52 98 %   10/16/19 2253 -- 83 21 100/55 98 %   10/16/19 2155 -- 97 (!) 33 113/65 98 %   10/16/19 2028 -- 85 (!) 42 96/54 98 %   10/16/19 2008 -- 92 -- 100/62 --   10/16/19 1958 98 °F (36.7 °C) 91 (!) 39 100/62 99 %   10/16/19 1927 -- -- -- -- 100 %   10/16/19 1913 -- 78 25 92/55 100 %   10/16/19 1909 -- 79 -- 101/66 99 %   10/16/19 1850 -- 76 24 -- 100 %     Oxygen Therapy  O2 Sat (%): 94 % (10/17/19 1214)  Pulse via Oximetry: 87 beats per minute (10/17/19 1214)  O2 Device: Room air (10/17/19 0700)    Estimated body mass index is 31.32 kg/m² as calculated from the following:    Height as of an earlier encounter on 10/16/19: 5' 7\" (1.702 m). Weight as of an earlier encounter on 10/16/19: 90.7 kg (200 lb).       Intake/Output Summary (Last 24 hours) at 10/17/2019 1409  Last data filed at 10/17/2019 1333  Gross per 24 hour   Intake 2000 ml   Output 2 ml   Net 1998 ml       *Note that automatically entered I/Os may not be accurate; dependent on patient compliance with collection and accurate  by [facility]. General:    Well nourished. Alert. No distress   CV:   RRR. No murmur, rub, or gallop. No edema  Lungs:   CTAB. No wheezing, rhonchi, or rales. Abdomen:   Soft, nontender, nondistended. Extremities: Warm and dry  Skin:     No rashes or jaundice. Neuro:  No gross focal deficits    Data Review:  I have reviewed all labs, meds, and studies from the last 24 hours:    Recent Results (from the past 24 hour(s))   GLUCOSE, POC    Collection Time: 10/16/19  2:51 PM   Result Value Ref Range    Glucose (POC) 60 (L) 65 - 100 mg/dL   GLUCOSE, POC    Collection Time: 10/16/19  3:31 PM   Result Value Ref Range    Glucose (POC) 150 (H) 65 - 100 mg/dL   GLUCOSE, POC    Collection Time: 10/16/19  5:49 PM   Result Value Ref Range    Glucose (POC) 128 (H) 65 - 125 mg/dL   METABOLIC PANEL, BASIC    Collection Time: 10/17/19  3:20 AM   Result Value Ref Range    Sodium 137 136 - 145 mmol/L    Potassium 3.8 3.5 - 5.1 mmol/L    Chloride 106 98 - 107 mmol/L    CO2 23 21 - 32 mmol/L    Anion gap 8 7 - 16 mmol/L    Glucose 96 65 - 100 mg/dL    BUN 20 6 - 23 MG/DL    Creatinine 5.86 (H) 0.6 - 1.0 MG/DL    GFR est AA 10 (L) >60 ml/min/1.73m2    GFR est non-AA 8 (L) >60 ml/min/1.73m2    Calcium 8.5 8.3 - 10.4 MG/DL   CBC WITH AUTOMATED DIFF    Collection Time: 10/17/19  3:20 AM   Result Value Ref Range    WBC 3.9 (L) 4.3 - 11.1 K/uL    RBC 2.64 (L) 4.05 - 5.2 M/uL    HGB 7.6 (L) 11.7 - 15.4 g/dL    HCT 24.7 (L) 35.8 - 46.3 %    MCV 93.6 79.6 - 97.8 FL    MCH 28.8 26.1 - 32.9 PG    MCHC 30.8 (L) 31.4 - 35.0 g/dL    RDW 15.8 (H) 11.9 - 14.6 %    PLATELET 463 (L) 564 - 450 K/uL    MPV 10.6 9.4 - 12.3 FL    ABSOLUTE NRBC 0.00 0.0 - 0.2 K/uL    NEUTROPHILS 47 43 - 78 %    LYMPHOCYTES 36 13 - 44 %    MONOCYTES 13 (H) 4.0 - 12.0 %    EOSINOPHILS 2 0.5 - 7.8 %    BASOPHILS 1 0.0 - 2.0 %    IMMATURE GRANULOCYTES 1 0.0 - 5.0 %    ABS.  NEUTROPHILS 1.9 1.7 - 8.2 K/UL    ABS. LYMPHOCYTES 1.4 0.5 - 4.6 K/UL    ABS. MONOCYTES 0.5 0.1 - 1.3 K/UL    ABS. EOSINOPHILS 0.1 0.0 - 0.8 K/UL    ABS. BASOPHILS 0.0 0.0 - 0.2 K/UL    ABS. IMM. GRANS. 0.0 0.0 - 0.5 K/UL    RBC COMMENTS NORMOCYTIC/NORMOCHROMIC      WBC COMMENTS MODERATE      PLATELET COMMENTS SLIGHT      DF AUTOMATED     FERRITIN    Collection Time: 10/17/19  3:20 AM   Result Value Ref Range    Ferritin 167 8 - 388 NG/ML   TRANSFERRIN SATURATION    Collection Time: 10/17/19  3:20 AM   Result Value Ref Range    Iron 23 (L) 35 - 150 ug/dL    TIBC 91 (L) 250 - 450 ug/dL    Transferrin Saturation 25 >20 %   VITAMIN B12    Collection Time: 10/17/19  3:20 AM   Result Value Ref Range    Vitamin B12 386 193 - 986 pg/mL   FOLATE    Collection Time: 10/17/19  3:20 AM   Result Value Ref Range    Folate 15.2 3.1 - 17.5 ng/mL   GLUCOSE, POC    Collection Time: 10/17/19  1:33 PM   Result Value Ref Range    Glucose (POC) 145 (H) 65 - 100 mg/dL        All Micro Results     Procedure Component Value Units Date/Time    C. DIFFICILE AG & TOXIN A/B [[id number]]     Order Status:  Sent Specimen:  Stool     CULTURE, STOOL [[id number]]     Order Status:  Sent Specimen:  Stool     OVA & PARASITES, STOOL [[id number]]     Order Status:  Sent Specimen:  Stool from Feces           No results found for this visit on 10/16/19.     Current Meds:  Current Facility-Administered Medications   Medication Dose Route Frequency    famotidine (PEPCID) tablet 20 mg  20 mg Oral QPM    [START ON 10/18/2019] epoetin florecita-epbx (RETACRIT) injection 10,000 Units  10,000 Units SubCUTAneous DIALYSIS MON, WED & FRI    LORazepam (ATIVAN) tablet 0.5 mg  0.5 mg Oral Q6H PRN    sodium chloride (NS) flush 5-40 mL  5-40 mL IntraVENous Q8H    sodium chloride (NS) flush 5-40 mL  5-40 mL IntraVENous PRN    NUTRITIONAL SUPPORT ELECTROLYTE PRN ORDERS   Does Not Apply PRN    acetaminophen (TYLENOL) tablet 650 mg  650 mg Oral Q4H PRN    HYDROcodone-acetaminophen (NORCO) 5-325 mg per tablet 1 Tab  1 Tab Oral Q4H PRN    morphine injection 2 mg  2 mg IntraVENous Q4H PRN    naloxone (NARCAN) injection 0.4 mg  0.4 mg IntraVENous PRN    bisacodyl (DULCOLAX) tablet 5 mg  5 mg Oral DAILY PRN    heparin (porcine) injection 5,000 Units  5,000 Units SubCUTAneous Q8H    dextrose 40% (GLUTOSE) oral gel 1 Tube  15 g Oral PRN    glucagon (GLUCAGEN) injection 1 mg  1 mg IntraMUSCular PRN    dextrose (D50W) injection syrg 12.5-25 g  25-50 mL IntraVENous PRN    tacrolimus (PROGRAF) capsule 3 mg  3 mg Oral DAILY    hydrOXYzine pamoate (VISTARIL) capsule 25 mg  25 mg Oral TID PRN    midodrine (PROAMITINE) tablet 10 mg  10 mg Oral TID WITH MEALS    rifAXIMin (XIFAXAN) tablet 550 mg  550 mg Oral BID    rOPINIRole (REQUIP) tablet 0.25 mg  0.25 mg Oral QHS PRN    levothyroxine (SYNTHROID) tablet 175 mcg  175 mcg Oral 6am    mycophenolate mofetil (CELLCEPT) capsule 500 mg  500 mg Oral ACB&D    promethazine (PHENERGAN) with saline injection 12.5 mg  12.5 mg IntraVENous Q6H PRN       Other Studies (last 24 hours):  Ct Head Wo Cont    Result Date: 10/16/2019  Examination: CT scan of the brain without contrast. History: Severe headache, 38 years Female  Pt states she is SOB and vision felt like it was going away. Pt had a paracentesis this morning and had 7-8 liters removed. States vision is getting better but has a headache and still feeling SOB Technique: 5 mm axial imaging of the brain from the posterior fossa to the vertex. All CT scans at this location are performed using dose modulation techniques as appropriate to a performed exam including the following: automated exposure control; adjustment of the mA and/or kV according to patient's size (this includes techniques or standardized protocols for targeted exams where dose is matched to indication/reason for exam; i.e. extremities or head); use of iterative reconstruction technique. Comparison:  CT brain October 21, 2010 Findings:   The brain parenchyma appears within normal limits for age. The ventricles, sulci are age-appropriate. No intracranial hemorrhage or extra-axial collection is identified. No evidence of acute infarct. No mass effect or midline shift is present. Basal cisterns are intact. The visualized paranasal sinuses and mastoid air cells are clear. The orbits, bones, and soft tissues are normal in appearance. Impression:  Normal unenhanced CT of the brain for age. No acute intracranial abnormality. Xr Chest Port    Result Date: 10/16/2019  AP chest radiograph History: dyspnea, 38 years Female Comparison: Chest radiograph July 07, 2015 Findings:  Interval placement of a right subclavian approach central venous dialysis catheter, which appears to terminate in the right atrium. Partially obscured cardiomediastinal silhouette. Persistent low lung volumes. New moderate right pleural effusion with associated right basilar atelectasis and or consolidation. There is probable also acute moderate asymmetric pulmonary edema. No evidence of pneumothorax. Visualized soft tissue and osseous structures otherwise unremarkable. Impression:  Probable acute asymmetric moderate pulmonary edema with a new moderate right pleural effusion and associated right basilar atelectasis and or consolidation.        Assessment and Plan:     Hospital Problems as of 10/17/2019 Date Reviewed: 2/27/2018          Codes Class Noted - Resolved POA    Hypotension ICD-10-CM: I95.9  ICD-9-CM: 458.9  10/16/2019 - Present Unknown        Hypoglycemia ICD-10-CM: E16.2  ICD-9-CM: 251.2  10/16/2019 - Present Unknown              Plan:  · Hypotension: continued on midodrine, s/p albumin/ IVF boluses, will workup for infections with BC x 2 , CXR with RLL atelectasis and anuric , continued midodrine  · ESRD on HD: defer to nephrology  · Nausea/emesis and loose BM: consult GI as perpetual issue   · RLS: requip and ativan prn   · Hypoglycemia: monitor glucoses · Liver transplant status: GI to evaluate, followed per [facility], continued cellcept/ prograf, check prograft level, continued xifaxin  · Anemia: taking retacrit  · Right pleural effusion: not symptomatic, likely hepatic hydrothorax, no need for acute thoracentesis       DC planning/Dispo:  Pending       Diet:  DIET REGULAR  DVT ppx:  heparin    Signed:  Jazmin Mojica MD

## 2019-10-17 NOTE — PROGRESS NOTES
TRANSFER - IN REPORT:    Verbal report received from Janki Pond RN on [employer]  being received from ICU for routine progression of care      Report consisted of patients Situation, Background, Assessment and   Recommendations(SBAR). Information from the following report(s) SBAR was reviewed with the receiving nurse. Opportunity for questions and clarification was provided. Assessment completed upon patients arrival to unit and care assumed.

## 2019-10-17 NOTE — PROGRESS NOTES
Care Management Interventions  PCP Verified by CM: Yes(Juani Nance)  Mode of Transport at Discharge: Other (see comment)(Mother )  Transition of Care Consult (CM Consult): Discharge Planning, Other  Discharge Durable Medical Equipment: No  Physical Therapy Consult: No  Occupational Therapy Consult: No  Speech Therapy Consult: No  Current Support Network: Other(w/ Mom and Dad )  Confirm Follow Up Transport: Self  Plan discussed with Pt/Family/Caregiver: Yes  Freedom of Choice Offered: Yes  Discharge Location  Discharge Placement: Other:(Home with no needs at this time. )    This CM met with pt at bedside to complete assessment. Pt presented alert and oriented. Pt verified her insurance, address, emergency contact and PCP. Pt stated her current PCP is different then on file and is currently seeing Román Guerrero @ [facility]. CM will notify admission to update information. Pt confirms she has no difficulty in obtaining medications in the community. She currently lives in home with mother and father. Pt stated that she has a POA but it is processed through the [facility] which is listed as her mother. Pt confirms that she has walking equipment that is accessible from past procedures, however does not currently use or have any additional DME in the home. She confirms at a baseline prior to ER admission, she is independent with her ADL'S including bathing, dressing, cooking, and driving. We discussed discharge plan this day, at this time, pt plans on returning home at discharge. No additional discharge needs identified at this time. CM will continue to follow.

## 2019-10-17 NOTE — PROGRESS NOTES
TRANSFER - OUT REPORT:      Verbal report given to Rupa(name) on Zandra Argueta  being transferred to (unit) for routine progression of care. Report consisted of patients Situation, Background, Assessment and   Recommendations(SBAR). Information from the following report(s) SBAR was reviewed with the receiving nurse. Opportunity for questions and clarification was provided.       Patient transported with:   Monitor

## 2019-10-17 NOTE — PROGRESS NOTES
MD og at bedside for patients hypotension (BP 80/44).  Orders recived to give 1000 ML NS over 2 hours

## 2019-10-17 NOTE — PROGRESS NOTES
Patient received to room 806 via wheelchair, escorted by ICU RN, [facility]. Admission assessment completed. Respirations regular rate & rhythm on room. No s/sx of distress. Patient with no complaints at this time. Requests to take medications with food upon arrival. Patient and mother oriented to room & unit. Verbalized understanding to call with further needs. Bed in low & locked position. Call light and personal belongings within reach. Family/visitors present at the bedside.

## 2019-10-17 NOTE — PROGRESS NOTES
Interdisciplinary team rounds were held 10/17/2019 with the following team members:Care Management, Nursing, Nurse Practitioner, Nutrition, Palliative Care, Pastoral Care, Pharmacy, Physical Therapy, Physician, Respiratory Therapy and Clinical Coordinator and the patient. Plan of care discussed. See clinical pathway and/or care plan for interventions and desired outcomes.

## 2019-10-17 NOTE — CONSULTS
[facility] Consult Note       Primary GI Physician: Dr. Kirti Trevino at [facility]    Referring Provider:  Dr. Mihai Rosenberg Date:  10/17/2019    Admit Date:  10/16/2019    Chief Complaint:  Liver transplant, Anemia, nausea, vomiting    Subjective:     History of Present Illness:  Patient is a 34 y.o. female with PMH of but not limited to DALY cirrhosis s/p liver transplant on Program and Celcept, Cardiac arrest in June 2019, ESRD following cardiac arrest requiring HD, Recurrent Ascites who is seen in consultation at the request of Dr. Jimmie Verma for liver transplant, anemia, nausea, vomiting. Mrs. Don Reid underwent large volume paracentesis on 10/16/19 with hypotension following this. She was also noted to be hypoglycemic with BG of 60. No history of DM. Labs on admission with TB 1.9, AST 38, ALT 14, , WBC 3.1, HGB 8.0, MCV 93. She has chronic hypotension and anemia. She has been having large volume paracentesis every 4 weeks. Mrs. Don Reid reports a history of nausea and vomiting after eating which started a few months ago. This is occurring more frequently. Emesis is described as \"bile\". She denies any heartburn/GERD symptoms. She is currently taking Zantac 150mg PO QPM. Denies any NSAIDS. She reports having loose stools a few times daily. She has thought that this was related to Xifaxan. She is not on Lactulose. Denies any recent confusion. Denies any melena/hematochezia. Denies any abdominal pain. She reports a history of Gastric Bypass surgery for weight loss in 2009. She had a recent visit at [facility] in September 2019 where labs revealed HGB 8.6, , MCV 97. There was discussion of possible dual organ transplant (renal and liver) in the future.      Review of [facility] Records:  Upper endoscopy 6/12/2019, normal  Abdominal imaging MRI/MRCP 6/13/2019 without contrast- no biliary dilation  Ultrasound 6/11/2019- nodular liver no lesions, splenomegaly  Large volume ascites  Liver biopsy 10/29/2018- DALY and stage 2/3 fibrosis       PMH:  Past Medical History:   Diagnosis Date    Chronic kidney disease 4/20/2015    Coarctation of aorta, congenital     S/P repair    Hashimoto's thyroiditis 8/8/2017    Heart murmur     History of kidney stones     History of ovarian cyst     [facility] transplanted [facility] [address]     DALY (nonalcoholic steatohepatitis)     Obesity        PSH:  Past Surgical History:   Procedure Laterality Date    CARDIAC SURG PROCEDURE UNLIST      coarctation of aorta as child with surgery    CYSTOSCOPY,INSERT URETERAL STENT      several kidney stones removed -- cysto    HX APPENDECTOMY      1996    HX COLONOSCOPY      HX ENDOSCOPY      HX GASTRIC BYPASS  2009    HX LAP CHOLECYSTECTOMY  02/2014    HX LIVER TRANSPLANT  08/2015    HX OTHER SURGICAL      liver biopsy, paracentensis       Allergies: Allergies   Allergen Reactions    Sulfa (Sulfonamide Antibiotics) Hives     And itching    Tetracycline Shortness of Breath    Piperacillin-Tazobactam Hives     Swelling      Reglan [Metoclopramide] Other (comments)     Unusual gait    Toradol [Ketorolac] Hives and Other (comments)     Has had allergic rx before with this med-- but not every time        Home Medications:  Prior to Admission medications    Medication Sig Start Date End Date Taking? Authorizing Provider   levothyroxine (SYNTHROID) 175 mcg tablet Take 175 mcg by mouth Daily (before breakfast). Yes Provider, Historical   mycophenolate (CELLCEPT) 500 mg tablet Take 250 mg by mouth two (2) times a day. Yes Provider, Historical   midodrine (PROAMITINE) 10 mg tablet Take 10 mg by mouth three (3) times daily. Yes Provider, Historical   raNITIdine (ZANTAC) 150 mg tablet Take 150 mg by mouth two (2) times a day. Yes Provider, Historical   rifAXIMin (XIFAXAN) 550 mg tablet Take 550 mg by mouth two (2) times a day.    Yes Provider, Historical   potassium chloride SR (KLOR-CON 10) 10 mEq tablet Take 10 mEq by mouth two (2) times a day. Yes Provider, Historical   zinc sulfate 220 mg tablet Take 220 mg by mouth two (2) times a day. Yes Provider, Historical   SUMAtriptan (IMITREX) 50 mg tablet Take 1 tab PO at onset of migraine with large glass of water. May repeat 2 hours later if needed. No more than 2 tabs in a day 2/27/18   Diego Andrade DO   tacrolimus (PROGRAF) 1 mg capsule Take 1 mg by mouth every twelve (12) hours. Indications: 4 tabs in the AM and 3 tabs in the PM    Provider, Historical   mycophenolate sodium (MYFORTIC) 360 mg delayed release tablet Take 360 mg by mouth two (2) times a day. Indications: 4 tabs 2 times a day    Provider, Historical   SODIUM BICARBONATE PO Take 10 mcg by mouth two (2) times a day.     Provider, Historical       Hospital Medications:  Current Facility-Administered Medications   Medication Dose Route Frequency    famotidine (PEPCID) tablet 20 mg  20 mg Oral QPM    [START ON 10/18/2019] epoetin florecita-epbx (RETACRIT) injection 10,000 Units  10,000 Units SubCUTAneous DIALYSIS MON, WED & FRI    sodium chloride (NS) flush 5-40 mL  5-40 mL IntraVENous Q8H    sodium chloride (NS) flush 5-40 mL  5-40 mL IntraVENous PRN    NUTRITIONAL SUPPORT ELECTROLYTE PRN ORDERS   Does Not Apply PRN    acetaminophen (TYLENOL) tablet 650 mg  650 mg Oral Q4H PRN    HYDROcodone-acetaminophen (NORCO) 5-325 mg per tablet 1 Tab  1 Tab Oral Q4H PRN    morphine injection 2 mg  2 mg IntraVENous Q4H PRN    naloxone (NARCAN) injection 0.4 mg  0.4 mg IntraVENous PRN    ondansetron (ZOFRAN) injection 4 mg  4 mg IntraVENous Q4H PRN    bisacodyl (DULCOLAX) tablet 5 mg  5 mg Oral DAILY PRN    heparin (porcine) injection 5,000 Units  5,000 Units SubCUTAneous Q8H    dextrose 40% (GLUTOSE) oral gel 1 Tube  15 g Oral PRN    glucagon (GLUCAGEN) injection 1 mg  1 mg IntraMUSCular PRN    dextrose (D50W) injection syrg 12.5-25 g  25-50 mL IntraVENous PRN    tacrolimus (PROGRAF) capsule 3 mg  3 mg Oral DAILY    hydrOXYzine pamoate (VISTARIL) capsule 25 mg  25 mg Oral TID PRN    midodrine (PROAMITINE) tablet 10 mg  10 mg Oral TID WITH MEALS    rifAXIMin (XIFAXAN) tablet 550 mg  550 mg Oral BID    rOPINIRole (REQUIP) tablet 0.25 mg  0.25 mg Oral QHS PRN    levothyroxine (SYNTHROID) tablet 175 mcg  175 mcg Oral 6am    mycophenolate mofetil (CELLCEPT) capsule 500 mg  500 mg Oral ACB&D    promethazine (PHENERGAN) with saline injection 12.5 mg  12.5 mg IntraVENous Q6H PRN       Social History:  Social History     Tobacco Use    Smoking status: Former Smoker     Packs/day: 0.50     Years: 15.00     Pack years: 7.50     Types: Cigarettes     Last attempt to quit: 2015     Years since quittin.5    Smokeless tobacco: Never Used    Tobacco comment: 5-6 cigarettes a day   Substance Use Topics    Alcohol use: No     Alcohol/week: 0.0 standard drinks     Comment: occ       Pt denies any history of drug use, blood transfusions, or tattoos. Family History:  Family History   Problem Relation Age of Onset    Delayed Awakening Father     Hypertension Father     Cancer Maternal Uncle         Leomyosarmia    Cancer Paternal Uncle         colon stage 2    Diabetes Maternal Grandmother     Diabetes Maternal Grandfather     No Known Problems Mother        Review of Systems:  A detailed 10 system ROS is obtained, with pertinent positives as listed above. All others are negative. Diet:  Regular    Objective:     Physical Exam:  Vitals:  Visit Vitals  BP 95/58   Pulse 85   Temp 98.4 °F (36.9 °C)   Resp 17   SpO2 94%     Gen:  Pt is drowsy but arouses easily, cooperative, no acute distress  Skin:  Extremities and face reveal no rashes. HEENT: Sclerae anicteric. Extra-occular muscles are intact. No oral ulcers. No abnormal pigmentation of the lips. The neck is supple. Cardiovascular: Regular rate and rhythm. No murmurs, gallops, or rubs. Respiratory:  Comfortable breathing with no accessory muscle use.  Clear breath sounds anteriorly with no wheezes, rales, or rhonchi. GI:  Abdomen nondistended, soft, and nontender. Normal active bowel sounds. No enlargement of the liver or spleen. No masses palpable. Rectal:  Deferred  Musculoskeletal:  No pitting edema of the lower legs. Neurological:  Gross memory appears intact. Patient is alert and oriented. Lymphatic:  No cervical or supraclavicular adenopathy. Laboratory:    Recent Labs     10/17/19  0320 10/16/19  1321   WBC 3.9* 3.1*   HGB 7.6* 8.0*   HCT 24.7* 26.8*   * 112*   MCV 93.6 93.7    135*   K 3.8 3.7    98   CO2 23 25   BUN 20 19   CREA 5.86* 5.66*   CA 8.5 8.9   MG  --  2.3   GLU 96 160*   AP  --  173*   SGOT  --  38*   ALT  --  14   TBILI  --  1.9*   ALB  --  3.4*   TP  --  6.5   LPSE  --  109      Examination: CT scan of the brain without contrast.     History: Severe headache, 38 years Female  Pt states she is SOB and vision felt  like it was going away. Pt had a paracentesis this morning and had 7-8 liters  removed. States vision is getting better but has a headache and still feeling  SOB     Technique: 5 mm axial imaging of the brain from the posterior fossa to the  vertex. All CT scans at this location are performed using dose modulation  techniques as appropriate to a performed exam including the following: automated  exposure control; adjustment of the mA and/or kV according to patient's size  (this includes techniques or standardized protocols for targeted exams where  dose is matched to indication/reason for exam; i.e. extremities or head); use of  iterative reconstruction technique.     Comparison:  CT brain October 21, 2010     Findings: The brain parenchyma appears within normal limits for age. The  ventricles, sulci are age-appropriate. No intracranial hemorrhage or extra-axial  collection is identified. No evidence of acute infarct. No mass effect or  midline shift is present. Basal cisterns are intact.   The visualized paranasal  sinuses and mastoid air cells are clear. The orbits, bones, and soft tissues are  normal in appearance.     IMPRESSION  Impression:  Normal unenhanced CT of the brain for age. No acute intracranial  abnormality. Assessment:     Active Problems:    Hypotension (10/16/2019)      Hypoglycemia (10/16/2019)     Patient is a 34 y.o. female with PMH of but not limited to DALY cirrhosis s/p liver transplant, Gastric Bypass surgery, Cardiac arrest in June 2019, ESRD following cardiac arrest and multisystem organ failure requiring HD, Recurrent Ascites who is seen in consultation at the request of Dr. Chris Olivares for liver transplant, anemia, nausea, vomiting. EGD in June 2019 was unremarkable at [facility]. Plan:     Follow labs and transfuse as needed  Continue with her transplant medications  Continue with midodrine  No immediate plans for endoscopy especially given her aspiration at time of last EGD resulting in intubation. Will follow. Patient and mother were agreeable to this. Will avoid Zofran given history of widening QRS complex with this. Check Stool studies for infection, also check urine porphyrins. Continue with Xifaxan  Start on protonix   Nephrology is adding EPO  We will check Iron studies  She is followed at [facility] for history of liver transplant and recent discussions with them have included possible dual organ transplant (renal and liver) in the future. She also has an appointment at [address] on 11/20/2019 to discuss Renal transplant. Further recommendations pending clinical course. Trudy Ruiz NP    Patient is seen and examined in collaboration with Dr. Jonathan Haddad. Assessment and plan as per Dr. Jonathan Haddad.

## 2019-10-17 NOTE — CONSULTS
[facility] CONSULT NOTE    Admission Date:  10/16/2019    Admission Diagnosis:  Hypotension [I95.9]  Hypoglycemia [E16.2]    Consulting physician: David Koo  Reason for consult: ESRD on HD       Subjective:   History of Present Illness:    35 y/o female with PMH of DALY Cirrhosis s/p liver transplant 2015 on cellcept and prograf /ESRD on HD TTS schedule at [facility] is admitted yesterday for hypotension following large volume paracentesis .  She has chronic hypotension on midodrine   Her last hd was on Tuesday at HD unit     s- seen in ICU - BP still on the low side , no complaints , talking and mentating  well     Past Medical History:   Diagnosis Date    Chronic kidney disease 4/20/2015    Coarctation of aorta, congenital     S/P repair    Hashimoto's thyroiditis 8/8/2017    Heart murmur     History of kidney stones     History of ovarian cyst     Liver transplanted ([facility] [address]     DALY (nonalcoholic steatohepatitis)     Obesity       Past Surgical History:   Procedure Laterality Date    CARDIAC SURG PROCEDURE UNLIST      coarctation of aorta as child with surgery    CYSTOSCOPY,INSERT URETERAL STENT      several kidney stones removed -- cysto    HX APPENDECTOMY      1996    HX COLONOSCOPY      HX ENDOSCOPY      HX GASTRIC BYPASS  2009    HX LAP CHOLECYSTECTOMY  02/2014    HX LIVER TRANSPLANT  08/2015    HX OTHER SURGICAL      liver biopsy, paracentensis      Current Facility-Administered Medications   Medication Dose Route Frequency    famotidine (PEPCID) tablet 20 mg  20 mg Oral QPM    sodium chloride (NS) flush 5-40 mL  5-40 mL IntraVENous Q8H    sodium chloride (NS) flush 5-40 mL  5-40 mL IntraVENous PRN    NUTRITIONAL SUPPORT ELECTROLYTE PRN ORDERS   Does Not Apply PRN    acetaminophen (TYLENOL) tablet 650 mg  650 mg Oral Q4H PRN    HYDROcodone-acetaminophen (NORCO) 5-325 mg per tablet 1 Tab  1 Tab Oral Q4H PRN    morphine injection 2 mg  2 mg IntraVENous Q4H PRN    naloxone (NARCAN) injection 0.4 mg  0.4 mg IntraVENous PRN    ondansetron (ZOFRAN) injection 4 mg  4 mg IntraVENous Q4H PRN    bisacodyl (DULCOLAX) tablet 5 mg  5 mg Oral DAILY PRN    heparin (porcine) injection 5,000 Units  5,000 Units SubCUTAneous Q8H    dextrose 40% (GLUTOSE) oral gel 1 Tube  15 g Oral PRN    glucagon (GLUCAGEN) injection 1 mg  1 mg IntraMUSCular PRN    dextrose (D50W) injection syrg 12.5-25 g  25-50 mL IntraVENous PRN    tacrolimus (PROGRAF) capsule 3 mg  3 mg Oral DAILY    hydrOXYzine pamoate (VISTARIL) capsule 25 mg  25 mg Oral TID PRN    midodrine (PROAMITINE) tablet 10 mg  10 mg Oral TID WITH MEALS    rifAXIMin (XIFAXAN) tablet 550 mg  550 mg Oral BID    rOPINIRole (REQUIP) tablet 0.25 mg  0.25 mg Oral QHS PRN    levothyroxine (SYNTHROID) tablet 175 mcg  175 mcg Oral 6am    mycophenolate mofetil (CELLCEPT) capsule 500 mg  500 mg Oral ACB&D    promethazine (PHENERGAN) with saline injection 12.5 mg  12.5 mg IntraVENous Q6H PRN    sodium chloride 0.9 % bolus infusion 500 mL  500 mL IntraVENous ONCE     Allergies   Allergen Reactions    Sulfa (Sulfonamide Antibiotics) Hives     And itching    Tetracycline Shortness of Breath    Piperacillin-Tazobactam Hives     Swelling      Reglan [Metoclopramide] Other (comments)     Unusual gait    Toradol [Ketorolac] Hives and Other (comments)     Has had allergic rx before with this med-- but not every time       Social History     Tobacco Use    Smoking status: Former Smoker     Packs/day: 0.50     Years: 15.00     Pack years: 7.50     Types: Cigarettes     Last attempt to quit: 2015     Years since quittin.5    Smokeless tobacco: Never Used    Tobacco comment: 5-6 cigarettes a day   Substance Use Topics    Alcohol use: No     Alcohol/week: 0.0 standard drinks     Comment: occ      Family History   Problem Relation Age of Onset    Delayed Awakening Father     Hypertension Father     Cancer Maternal Florentin Dayhoff    Cancer Paternal Uncle         colon stage 2    Diabetes Maternal Grandmother     Diabetes Maternal Grandfather     No Known Problems Mother         Review of Systems  Gen - no fever, no chills, appetite okay  HEENT - no sore throat, no decreased vision, no hearing loss  Neck - no neck mass  CV - no chest pain, no palpitation, no orthopnea  Lung - no shortness of breath, no cough, no hemoptysis  Abd - no tenderness, no nausea/vomiting, no bloody stool  Ext - no edema, no clubbing, no cyanosis  Musculoskeletal - no joint pain, no back pain  Neurologic - no headaches, no dizziness, no seizures  Psychiatric - no anxiety, no depression  Skin - no rashes, no pupura  Genitourinary - no decreased urine output, no hematuria, no foamy urine    Objective:   Vitals:    10/17/19 0528 10/17/19 0600 10/17/19 0731 10/17/19 0853   BP: 91/55 96/54  (!) 73/43   Pulse: 81 85  84   Resp: 16 21     Temp:   98.4 °F (36.9 °C)    SpO2: 94% 94%         Intake/Output Summary (Last 24 hours) at 10/17/2019 0931  Last data filed at 10/17/2019 0358  Gross per 24 hour   Intake 2000 ml   Output --   Net 2000 ml       Physical Exam  GEN :in no distress, alert and oriented  HEENT: anicteric sclerae, eomi. Oropharynx without lesions. Mucous membranes are moist.  Neck - supple without JVD, no thyromegaly. No lymphadenopathy. CV - regular rate and rhythm, no murmur, no rub  Lung - clear bilaterally, lungs expand symmetrically  Chest wall - normal appearance  Abd - soft, nontender, bowel sounds present, no hepatosplenomegaly  Ext - no clubbing, no cyanosis, no edema  Neurologic - nonfocal  Genitourinary - bladder nonpalpable  Skin - no rashes, no purpura, no ecchymoses  Psychiatric: Normal mood and affect.       Data Review:   Recent Labs     10/17/19  0320 10/16/19  1321   WBC 3.9* 3.1*   HGB 7.6* 8.0*   HCT 24.7* 26.8*   * 112*     Recent Labs     10/17/19  0320 10/16/19  1321    135*   K 3.8 3.7    98   CO2 23 25   BUN 20 19   CREA 5.86* 5.66*   GLU 96 160*   CA 8.5 8.9   MG  --  2.3   PHOS  --  4.5     No results for input(s): PH, PCO2, PO2, PCO2 in the last 72 hours. Problem List:     Patient Active Problem List    Diagnosis Date Noted    Hypotension 10/16/2019    Hypoglycemia 10/16/2019    Hashimoto's thyroiditis 08/08/2017    Liver replaced by transplant ([facility] 08/08/2017    Need for prophylactic immunotherapy 08/08/2017    Obesity     Biliary anastomotic stricture 08/20/2016    [facility]    DALY (nonalcoholic steatohepatitis) 10/19/2014    Hypothyroidism 09/16/2014       Assessment and Plan:    1. ESRD on HD TTS   She is euvolumic and the electrolytes are wnl . Will hold hd today . Her last hd was on Tuesday , will do next hd tomorrow when she is more hemodynamically stable . No urgent indication for hd today   Continue with midodrine     2. S/p liver transplant   Continue with current IS     3. Anemia will do epo with HD     4. AMY Spaulding MD

## 2019-10-17 NOTE — PROGRESS NOTES
Initial visit made to patient and a prayer was provided to her and a family member. A  card was left. Her nurse was in the room with her and the patient was seeking relief from a cramp in her leg that her nurse was providing assistance.         Denver Meigs, MDIV Chaplain

## 2019-10-17 NOTE — PROGRESS NOTES
LEAPFROG PROTOCOL NOTE    Shanika Burks  10/16/2019    The patient is currently in the critical care setting managed by Dr. Ceci George with hypotension post paracentesis. The patient's chart is reviewed and the patient is discussed with the staff. Patient is currently hemodynamically improved. Patient has no needs identified for Intensivist management in the critical care setting at this time. Please notify us if can be of assistance. No charge billed to the patient. Thank you.     Neisha Brown MD

## 2019-10-18 LAB
ALBUMIN SERPL-MCNC: 2.6 G/DL (ref 3.5–5)
ALBUMIN/GLOB SERPL: 0.9 {RATIO} (ref 1.2–3.5)
ALP SERPL-CCNC: 146 U/L (ref 50–136)
ALT SERPL-CCNC: 13 U/L (ref 12–65)
ANION GAP SERPL CALC-SCNC: 10 MMOL/L (ref 7–16)
AST SERPL-CCNC: 29 U/L (ref 15–37)
BASOPHILS # BLD: 0 K/UL (ref 0–0.2)
BASOPHILS NFR BLD: 0 % (ref 0–2)
BILIRUB SERPL-MCNC: 1.6 MG/DL (ref 0.2–1.1)
BUN SERPL-MCNC: 26 MG/DL (ref 6–23)
C DIFF GDH STL QL: NORMAL
C DIFF TOX A+B STL QL IA: NORMAL
CALCIUM SERPL-MCNC: 8.8 MG/DL (ref 8.3–10.4)
CHLORIDE SERPL-SCNC: 108 MMOL/L (ref 98–107)
CLINICAL CONSIDERATION: NORMAL
CO2 SERPL-SCNC: 21 MMOL/L (ref 21–32)
CREAT SERPL-MCNC: 7.41 MG/DL (ref 0.6–1)
DIFFERENTIAL METHOD BLD: ABNORMAL
EOSINOPHIL # BLD: 0.1 K/UL (ref 0–0.8)
EOSINOPHIL NFR BLD: 2 % (ref 0.5–7.8)
ERYTHROCYTE [DISTWIDTH] IN BLOOD BY AUTOMATED COUNT: 16.1 % (ref 11.9–14.6)
GLOBULIN SER CALC-MCNC: 2.8 G/DL (ref 2.3–3.5)
GLUCOSE BLD STRIP.AUTO-MCNC: 116 MG/DL (ref 65–100)
GLUCOSE BLD STRIP.AUTO-MCNC: 145 MG/DL (ref 65–100)
GLUCOSE SERPL-MCNC: 81 MG/DL (ref 65–100)
HCT VFR BLD AUTO: 26.2 % (ref 35.8–46.3)
HGB BLD-MCNC: 8 G/DL (ref 11.7–15.4)
IMM GRANULOCYTES # BLD AUTO: 0 K/UL (ref 0–0.5)
IMM GRANULOCYTES NFR BLD AUTO: 0 % (ref 0–5)
INTERPRETATION: NORMAL
LYMPHOCYTES # BLD: 1.6 K/UL (ref 0.5–4.6)
LYMPHOCYTES NFR BLD: 35 % (ref 13–44)
MCH RBC QN AUTO: 28.3 PG (ref 26.1–32.9)
MCHC RBC AUTO-ENTMCNC: 30.5 G/DL (ref 31.4–35)
MCV RBC AUTO: 92.6 FL (ref 79.6–97.8)
MONOCYTES # BLD: 0.4 K/UL (ref 0.1–1.3)
MONOCYTES NFR BLD: 9 % (ref 4–12)
NEUTS SEG # BLD: 2.4 K/UL (ref 1.7–8.2)
NEUTS SEG NFR BLD: 54 % (ref 43–78)
NRBC # BLD: 0 K/UL (ref 0–0.2)
PCR REFLEX: NORMAL
PLATELET # BLD AUTO: 174 K/UL (ref 150–450)
PMV BLD AUTO: 11.6 FL (ref 9.4–12.3)
POTASSIUM SERPL-SCNC: 4.2 MMOL/L (ref 3.5–5.1)
PROT SERPL-MCNC: 5.4 G/DL (ref 6.3–8.2)
RBC # BLD AUTO: 2.83 M/UL (ref 4.05–5.2)
SODIUM SERPL-SCNC: 139 MMOL/L (ref 136–145)
WBC # BLD AUTO: 4.5 K/UL (ref 4.3–11.1)

## 2019-10-18 PROCEDURE — 65660000000 HC RM CCU STEPDOWN

## 2019-10-18 PROCEDURE — 74011250636 HC RX REV CODE- 250/636: Performed by: INTERNAL MEDICINE

## 2019-10-18 PROCEDURE — 74011000250 HC RX REV CODE- 250: Performed by: HOSPITALIST

## 2019-10-18 PROCEDURE — 74011000250 HC RX REV CODE- 250: Performed by: INTERNAL MEDICINE

## 2019-10-18 PROCEDURE — 74011250636 HC RX REV CODE- 250/636: Performed by: HOSPITALIST

## 2019-10-18 PROCEDURE — 80053 COMPREHEN METABOLIC PANEL: CPT

## 2019-10-18 PROCEDURE — 85025 COMPLETE CBC W/AUTO DIFF WBC: CPT

## 2019-10-18 PROCEDURE — 36415 COLL VENOUS BLD VENIPUNCTURE: CPT

## 2019-10-18 PROCEDURE — 74011250637 HC RX REV CODE- 250/637: Performed by: HOSPITALIST

## 2019-10-18 PROCEDURE — 82962 GLUCOSE BLOOD TEST: CPT

## 2019-10-18 PROCEDURE — 74011250637 HC RX REV CODE- 250/637: Performed by: INTERNAL MEDICINE

## 2019-10-18 RX ORDER — PANTOPRAZOLE SODIUM 40 MG/1
40 TABLET, DELAYED RELEASE ORAL
Status: DISCONTINUED | OUTPATIENT
Start: 2019-10-19 | End: 2019-10-19 | Stop reason: HOSPADM

## 2019-10-18 RX ORDER — PROMETHAZINE HYDROCHLORIDE 12.5 MG/1
12.5 TABLET ORAL
Qty: 30 TAB | Refills: 0 | Status: SHIPPED | OUTPATIENT
Start: 2019-10-18 | End: 2019-10-19 | Stop reason: SDUPTHER

## 2019-10-18 RX ADMIN — PROMETHAZINE HYDROCHLORIDE 12.5 MG: 25 INJECTION INTRAMUSCULAR; INTRAVENOUS at 20:26

## 2019-10-18 RX ADMIN — Medication 10 ML: at 15:20

## 2019-10-18 RX ADMIN — FAMOTIDINE 20 MG: 20 TABLET, FILM COATED ORAL at 21:37

## 2019-10-18 RX ADMIN — ROPINIROLE HYDROCHLORIDE 0.5 MG: 0.25 TABLET, FILM COATED ORAL at 21:37

## 2019-10-18 RX ADMIN — ZINC 1 TABLET: TAB ORAL at 09:00

## 2019-10-18 RX ADMIN — LEVOTHYROXINE SODIUM 175 MCG: 100 TABLET ORAL at 09:15

## 2019-10-18 RX ADMIN — RIFAXIMIN 550 MG: 550 TABLET ORAL at 21:41

## 2019-10-18 RX ADMIN — Medication 10 ML: at 21:38

## 2019-10-18 RX ADMIN — LORAZEPAM 0.5 MG: 0.5 TABLET ORAL at 22:58

## 2019-10-18 RX ADMIN — MYCOPHENOLATE MOFETIL 500 MG: 250 CAPSULE ORAL at 09:17

## 2019-10-18 RX ADMIN — RIFAXIMIN 550 MG: 550 TABLET ORAL at 09:18

## 2019-10-18 RX ADMIN — HEPARIN SODIUM 5000 UNITS: 5000 INJECTION INTRAVENOUS; SUBCUTANEOUS at 07:56

## 2019-10-18 RX ADMIN — TACROLIMUS 3 MG: 1 CAPSULE ORAL at 09:19

## 2019-10-18 RX ADMIN — PROMETHAZINE HYDROCHLORIDE 12.5 MG: 25 INJECTION INTRAMUSCULAR; INTRAVENOUS at 15:18

## 2019-10-18 RX ADMIN — MIDODRINE HYDROCHLORIDE 10 MG: 5 TABLET ORAL at 16:18

## 2019-10-18 RX ADMIN — HEPARIN SODIUM 5000 UNITS: 5000 INJECTION INTRAVENOUS; SUBCUTANEOUS at 21:37

## 2019-10-18 RX ADMIN — LORAZEPAM 0.5 MG: 0.5 TABLET ORAL at 15:53

## 2019-10-18 RX ADMIN — LORAZEPAM 0.5 MG: 0.5 TABLET ORAL at 09:56

## 2019-10-18 RX ADMIN — MIDODRINE HYDROCHLORIDE 10 MG: 5 TABLET ORAL at 11:34

## 2019-10-18 RX ADMIN — MIDODRINE HYDROCHLORIDE 10 MG: 5 TABLET ORAL at 09:16

## 2019-10-18 RX ADMIN — Medication 10 ML: at 07:55

## 2019-10-18 RX ADMIN — PROMETHAZINE HYDROCHLORIDE 12.5 MG: 25 INJECTION INTRAMUSCULAR; INTRAVENOUS at 11:07

## 2019-10-18 RX ADMIN — MYCOPHENOLATE MOFETIL 500 MG: 250 CAPSULE ORAL at 16:18

## 2019-10-18 NOTE — PROGRESS NOTES
[facility] NURSE PROGRESS REPORT      SUBJECTIVE: Called to assess patient secondary to transfer from ICU. MEWS Score: 2 (10/18/19 0351)  Vitals:    10/18/19 0448 10/18/19 0719 10/18/19 1058 10/18/19 1146   BP:  92/53 99/65 90/57   Pulse:  71 83 94   Resp:  20 20    Temp:  98.2 °F (36.8 °C) 98.9 °F (37.2 °C)    SpO2:  95% 96%    Weight: 78.2 kg (172 lb 4.8 oz)           LAB DATA:    Recent Labs     10/18/19  0556 10/17/19  1805 10/17/19  0320 10/16/19  1321     --  137 135*   K 4.2  --  3.8 3.7   *  --  106 98   CO2 21  --  23 25   AGAP 10  --  8 12   GLU 81  --  96 160*   BUN 26*  --  20 19   CREA 7.41*  --  5.86* 5.66*   GFRAA 8*  --  10* 11*   GFRNA 6*  --  8* 9*   CA 8.8  --  8.5 8.9   MG  --  2.4  --  2.3   PHOS  --   --   --  4.5   ALB 2.6*  --   --  3.4*   TP 5.4*  --   --  6.5   GLOB 2.8  --   --  3.1   AGRAT 0.9*  --   --  1.1*   ALT 13  --   --  14        Recent Labs     10/18/19  0556 10/17/19  0320 10/16/19  1321   WBC 4.5 3.9* 3.1*   HGB 8.0* 7.6* 8.0*   HCT 26.2* 24.7* 26.8*    120* 112*          OBJECTIVE: On arrival to room, I found patient to be in bed with family at bedside. Pain Assessment  Pain Intensity 1: 0 (10/18/19 0120)  Pain Location 1: Head  Pain Intervention(s) 1: Medication (see MAR)  Patient Stated Pain Goal: 0        ASSESSMENT:  Patient in bed with no visible signs of distress, is tearful and complaining of nausea. Patient recently received PRN medication per chart. Patient denies any pain, breathing is even and unlabored. O2 sat 94% on room air, HR 94. Currently taking her midodrine. Discussed patient with primary RN who states patient will be discharged today. PLAN:  Will follow per outreach protocol.

## 2019-10-18 NOTE — PROGRESS NOTES
Patient requests blood glucose check. Yields 61 mg/dL at 2150. Patient received 240 mL apple juice. Recheck at 21  yields 127 mg/dL. Patient then request BP check. BP yields 93/52. VSS. Patient reports, \"I just don't feel good. I'm just really anxious, and irritated. \" Patient received dose of ATIVAN previously and REQUIP at this time. Received acetaminophen 650 mg via PO at this time for c/o headache.

## 2019-10-18 NOTE — PROGRESS NOTES
Patient exhibiting manipulative behaviors. Frequently calling for her primary RN stating \"it's urgent\", but when her primary RN is busy with another patient and this RN enters room, patient states \"I can wait on my nurse because only she will understand\". Patient reports she is \"feeling icky\" but cannot elaborate on any specific symptoms. Reports that she has not eaten anything because she is so nauseous but she ordered food for delivery to her room and there is an empty container and bag on her bedside table. She requested that we check her blood glucose, stating that \"it might be low because I can't eat or drink anything\" despite having just eaten her food she had delivered. Blood glucose checked by this RN, resulting at 145. This RN checked her blood pressure. BP 99/54. Patient informed of her BP and then says \"well maybe I'm just anxious and need more anxiety medicine\". Informed patient that she has already had her anxiety medicine and there is nothing else that she can have for anxiety at this time. Patient verbalized understanding. Primary RN aware of all of the above stated.

## 2019-10-18 NOTE — PROGRESS NOTES
Patient lying in bed resting quietly with eyes closed, appears to be sleeping. Respirations regular rate & rhythm on room air. No s/sx of distress.

## 2019-10-18 NOTE — PROGRESS NOTES
Patient lying in bed resting quietly with eyes closed, appears to be sleeping. Respirations regular rate & rhythm. No s/sx of distress noted.

## 2019-10-18 NOTE — PROGRESS NOTES
Problem: Falls - Risk of  Goal: *Absence of Falls  Description  Document Radha Esquivel Fall Risk and appropriate interventions in the flowsheet. Outcome: Progressing Towards Goal  Note:   Fall Risk Interventions:            Medication Interventions: Evaluate medications/consider consulting pharmacy    Elimination Interventions: Call light in reach, Stay With Me (per policy), Patient to call for help with toileting needs, Toileting schedule/hourly rounds              Problem: Risk for Spread of Infection  Goal: Prevent transmission of infectious organism to others  Description  Prevent the transmission of infectious organisms to other patients, staff members, and visitors.   Outcome: Progressing Towards Goal

## 2019-10-18 NOTE — PROGRESS NOTES
Bedside shift report given to DANNY Carlson. Respirations regular rate & rhythm on room air. No s/sx of distress.

## 2019-10-18 NOTE — PROGRESS NOTES
Critical Care Outreach Nurse Progress Report:    Subjective: In to assess pt secondary to transfer from ICU    MEWS Score: 2 (10/17/19 1950)    Vitals:    10/17/19 1758 10/17/19 1831 10/17/19 1833 10/17/19 1950   BP: (!) 89/51 110/60  95/60   Pulse: 83 69 80 80   Resp:  18  20   Temp:  98.1 °F (36.7 °C)  98.7 °F (37.1 °C)   SpO2: 94% 96%  96%        LAB DATA:    Recent Labs     10/17/19  1805 10/17/19  0320 10/16/19  1321   NA  --  137 135*   K  --  3.8 3.7   CL  --  106 98   CO2  --  23 25   AGAP  --  8 12   GLU  --  96 160*   BUN  --  20 19   CREA  --  5.86* 5.66*   GFRAA  --  10* 11*   GFRNA  --  8* 9*   CA  --  8.5 8.9   MG 2.4  --  2.3   PHOS  --   --  4.5   ALB  --   --  3.4*   TP  --   --  6.5   GLOB  --   --  3.1   AGRAT  --   --  1.1*   ALT  --   --  14        Recent Labs     10/17/19  0320 10/16/19  1321   WBC 3.9* 3.1*   HGB 7.6* 8.0*   HCT 24.7* 26.8*   * 112*        Objective: Patient in bed with mother at bedside    Pain Intensity 1: 0 (10/17/19 1830)        Patient Stated Pain Goal: 0    Assessment: Pattient admit to ICU for hypotension r/t paracentesis w/ 7.2 L removed. Currently with n/v given phenergan with some relief. Also issues with restless legs unable to take medications d/t nausea. BP marginal at times, on Midodrine at home.      Plan: Follow per Outreach protocol

## 2019-10-18 NOTE — PROGRESS NOTES
[facility] Progress Note         Admit Date:  10/16/2019    Today's Date:  10/18/2019    CC:  Anemia, n/v, s/p liver transplant    Subjective:     Patient is feeling better today. She feels the vomiting is related to the Xifaxan, occurring in the morning and evening after taking the medication and eating. She denies any bloody or foodstuff emesis, only yellow liquid emesis. She is not having abd pain today, but has had some off and on with gas. She has had regular BMs on Xifaxan, but denies any bloody or black stools. She is tolerating her diet.     Medications:   Current Facility-Administered Medications   Medication Dose Route Frequency    epoetin florecita-epbx (RETACRIT) injection 10,000 Units  10,000 Units SubCUTAneous DIALYSIS MON, WED & FRI    LORazepam (ATIVAN) tablet 0.5 mg  0.5 mg Oral Q6H PRN    rOPINIRole (REQUIP) tablet 0.5 mg  0.5 mg Oral QHS    lidocaine-prilocaine (EMLA) 2.5-2.5 % cream   Topical PRN    famotidine (PEPCID) tablet 20 mg  20 mg Oral QHS    multivitamin w ZN (STRESSTABS W ZINC) tablet  1 Tab Oral BID    sodium chloride (NS) flush 5-40 mL  5-40 mL IntraVENous Q8H    sodium chloride (NS) flush 5-40 mL  5-40 mL IntraVENous PRN    NUTRITIONAL SUPPORT ELECTROLYTE PRN ORDERS   Does Not Apply PRN    acetaminophen (TYLENOL) tablet 650 mg  650 mg Oral Q4H PRN    HYDROcodone-acetaminophen (NORCO) 5-325 mg per tablet 1 Tab  1 Tab Oral Q4H PRN    morphine injection 2 mg  2 mg IntraVENous Q4H PRN    naloxone (NARCAN) injection 0.4 mg  0.4 mg IntraVENous PRN    bisacodyl (DULCOLAX) tablet 5 mg  5 mg Oral DAILY PRN    heparin (porcine) injection 5,000 Units  5,000 Units SubCUTAneous Q8H    dextrose 40% (GLUTOSE) oral gel 1 Tube  15 g Oral PRN    glucagon (GLUCAGEN) injection 1 mg  1 mg IntraMUSCular PRN    dextrose (D50W) injection syrg 12.5-25 g  25-50 mL IntraVENous PRN    tacrolimus (PROGRAF) capsule 3 mg  3 mg Oral DAILY    hydrOXYzine pamoate (VISTARIL) capsule 25 mg  25 mg Oral TID PRN    midodrine (PROAMITINE) tablet 10 mg  10 mg Oral TID WITH MEALS    rifAXIMin (XIFAXAN) tablet 550 mg  550 mg Oral BID    levothyroxine (SYNTHROID) tablet 175 mcg  175 mcg Oral 6am    mycophenolate mofetil (CELLCEPT) capsule 500 mg  500 mg Oral ACB&D    promethazine (PHENERGAN) with saline injection 12.5 mg  12.5 mg IntraVENous Q6H PRN       Review of Systems:  ROS was obtained, with pertinent positives as listed above. No chest pain or SOB. Diet:  Regular    Objective:   Vitals:  Visit Vitals  BP 92/53   Pulse 71   Temp 98.2 °F (36.8 °C)   Resp 20   Wt 78.2 kg (172 lb 4.8 oz)   SpO2 95%   BMI 26.99 kg/m²     Intake/Output:  No intake/output data recorded. 10/16 1901 - 10/18 0700  In: 2840 [P.O.:840; I.V.:2000]  Out: 2   Exam:  General appearance: alert, cooperative, no distress, sitting up in bed  Lungs: clear to auscultation bilaterally anteriorly  Heart: regular rate and rhythm  Abdomen: soft, non-tender. Bowel sounds normal. No masses, no organomegaly  Neuro:  alert and oriented, no asterixis    Data Review (Labs):    Recent Labs     10/18/19  0556 10/17/19  1805 10/17/19  0320 10/16/19  1321   WBC 4.5  --  3.9* 3.1*   HGB 8.0*  --  7.6* 8.0*   HCT 26.2*  --  24.7* 26.8*     --  120* 112*   MCV 92.6  --  93.6 93.7     --  137 135*   K 4.2  --  3.8 3.7   *  --  106 98   CO2 21  --  23 25   BUN 26*  --  20 19   CREA 7.41*  --  5.86* 5.66*   CA 8.8  --  8.5 8.9   MG  --  2.4  --  2.3   GLU 81  --  96 160*   *  --   --  173*   SGOT 29  --   --  38*   ALT 13  --   --  14   TBILI 1.6*  --   --  1.9*   ALB 2.6*  --   --  3.4*   TP 5.4*  --   --  6.5   LPSE  --   --   --  109      Ref.  Range 10/17/2019 03:20   Vitamin B12 Latest Ref Range: 193 - 986 pg/mL 386   Folate Latest Ref Range: 3.1 - 17.5 ng/mL 15.2   Iron Latest Ref Range: 35 - 150 ug/dL 23 (L)   TIBC Latest Ref Range: 250 - 450 ug/dL 91 (L)   Transferrin Saturation Latest Ref Range: >20 % 25 Ferritin Latest Ref Range: 8 - 388 NG/     CT scan of the brain without contrast. 16 Oct 2019  History: Severe headache, 38 years Female  Pt states she is SOB and vision felt  like it was going away. Pt had a paracentesis this morning and had 7-8 liters  removed. States vision is getting better but has a headache and still feeling  SOB   Technique: 5 mm axial imaging of the brain from the posterior fossa to the  vertex.  All CT scans at this location are performed using dose modulation  techniques as appropriate to a performed exam including the following: automated  exposure control; adjustment of the mA and/or kV according to patient's size  (this includes techniques or standardized protocols for targeted exams where  dose is matched to indication/reason for exam; i.e. extremities or head); use of  iterative reconstruction technique.   Comparison:  CT brain October 21, 2010   Findings:  The brain parenchyma appears within normal limits for age.  The  ventricles, sulci are age-appropriate. No intracranial hemorrhage or extra-axial  collection is identified.  No evidence of acute infarct.  No mass effect or  midline shift is present. Basal cisterns are intact.  The visualized paranasal  sinuses and mastoid air cells are clear. The orbits, bones, and soft tissues are  normal in appearance.   IMPRESSION  Impression:  Normal unenhanced CT of the brain for age. Tova Almanza acute intracranial  Abnormality.     C diff, o and p, stool cx, giardia 17 Oct 2019 pending    Assessment:     Active Problems:    Hypotension (10/16/2019)      Hypoglycemia (10/16/2019)    35 yo female patient of Dr. Gina Lea and follows with Dr. 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 Gilson Patel for liver transplant, anemia, nausea, vomiting. She has had chronic anemia and is about at baseline. She feels the nausea and vomiting is related to side effect of Xifaxan. EGD in June 2019 was unremarkable at [facility], and she had major complications after that EGD. She has not had active bleeding. Plan:     -Supportive care.  -Schedule Phenergan prior to Xifaxan dosing (given prolonged QT interval, will hold off on scheduling Zofran). -C diff, o and p, giardia, and stool cx pending.  -Add Protonix 40 mg po daily.  -Continue Xifaxan. -Nephrology added epo to her HD.  -Continue Prograf and Cellcept. -Follow up with her hepatologist at [facility] after this admission. Patient is seen and examined in collaboration with Dr. Corrina Salamanca. Assessment and plan as per Dr. Payton Chou. Amee Alves  [facility]

## 2019-10-18 NOTE — PROGRESS NOTES
Patient fine with no complaints then mom comes and patient begins crying stating she is nausea. Medication given per order. Nephrology assessed and stated patient could go to dialysis tomorrow at her outpatient clinic. MD notified and patient will be d/c.

## 2019-10-18 NOTE — PROGRESS NOTES
Dual Skin assessment completed with Miguel Sheth RN. No pressure injuries; tattoos and scar present. HD access to Right chest wall; dressing clean, dry, & intact.

## 2019-10-18 NOTE — PROGRESS NOTES
Hospitalist Note     Admit Date:  10/16/2019  6:31 PM   Name:  Anitha Estimable   Age:  34 y.o.  :  1980   MRN:  [id number]   PCP:  No primary care provider on file. Treatment Team: Attending Provider: Yamile Barajas DO; Consulting Provider: Mey Hurst MD; Utilization Review: Tito Gutierrez RN; Care Manager: Juvenal Ovalle; Student Nurse: Mague Ness; Care Manager: Ganesh San RN    HPI/Subjective:     Ms. Emery Wade is a 35 yo female with PMH of liver transplant for DALY cirrhosis followed by [facility], ESRD on dialysis, cardiac arrest admitted with hypotension, nausea, emesis. She is due to see [facility] in 2 months. She had outpatient paracentesis at [facility] on 10-16-19 removing 8 L of fluid. She was doing well after her procedure until she had acute onset of confusion, nausea, emesis, headache. She was hypotensive upon ED arrival and received IVF boluses and albumin. She did have albumin post paracentesis as well. She takes midodrine chronically. She additionally had hypoglycemia that has improved. CT head negative, CXR shows moderate pleural effusion and atelectasis/ consolidation. She thinks she recalls having a prior effusion when at [facility]. Nephrology to continue with HD needs. Today, SBP  close to baseline per pt, intermittent nausea but no vomiting, anxious at times, no significant dizziness.       Objective:     Patient Vitals for the past 24 hrs:   Temp Pulse Resp BP SpO2   10/18/19 1815 98.8 °F (37.1 °C) 92 20 90/50 94 %   10/18/19 1633 -- 97 -- 93/53 --   10/18/19 1558 -- -- -- 92/48 --   10/18/19 1146 -- 94 -- 90/57 --   10/18/19 1058 98.9 °F (37.2 °C) 83 20 99/65 96 %   10/18/19 0719 98.2 °F (36.8 °C) 71 20 92/53 95 %   10/18/19 0351 98.2 °F (36.8 °C) 86 20 91/57 92 %   10/18/19 0224 -- 80 -- -- --   10/18/19 0035 -- -- -- 99/54 --   10/17/19 2326 98.4 °F (36.9 °C) 86 17 97/61 94 %   10/17/19 2218 -- 100 -- -- --   10/17/19 2216 98.2 °F (36.8 °C) 100 18 93/52 93 %   10/17/19 1950 98.7 °F (37.1 °C) 80 20 95/60 96 %   10/17/19 1833 -- 80 -- -- --   10/17/19 1831 98.1 °F (36.7 °C) 69 18 110/60 96 %     Oxygen Therapy  O2 Sat (%): 94 % (10/18/19 1815)  Pulse via Oximetry: 85 beats per minute (10/17/19 1713)  O2 Device: Room air (10/18/19 0120)    Estimated body mass index is 26.99 kg/m² as calculated from the following:    Height as of an earlier encounter on 10/16/19: 5' 7\" (1.702 m). Weight as of this encounter: 78.2 kg (172 lb 4.8 oz). Intake/Output Summary (Last 24 hours) at 10/18/2019 1821  Last data filed at 10/18/2019 1540  Gross per 24 hour   Intake 720 ml   Output 0 ml   Net 720 ml       *Note that automatically entered I/Os may not be accurate; dependent on patient compliance with collection and accurate  by techs. General:    Well nourished. Alert. Pale, moderate distress   CV:   RRR. No murmur, rub, or gallop. No edema  Lungs:   CTAB. No wheezing, rhonchi, or rales. Abdomen:   Soft, nontender, nondistended. Extremities: Warm and dry  Skin:     No rashes or jaundice.    Neuro:  No gross focal deficits    Data Review:  I have reviewed all labs, meds, and studies from the last 24 hours:    Recent Results (from the past 24 hour(s))   CULTURE, BLOOD    Collection Time: 10/17/19  7:16 PM   Result Value Ref Range    Special Requests: LEFT  HAND        Culture result: NO GROWTH AFTER 13 HOURS     GLUCOSE, POC    Collection Time: 10/17/19  9:50 PM   Result Value Ref Range    Glucose (POC) 61 (L) 65 - 100 mg/dL   GLUCOSE, POC    Collection Time: 10/17/19 10:12 PM   Result Value Ref Range    Glucose (POC) 127 (H) 65 - 100 mg/dL   GLUCOSE, POC    Collection Time: 10/18/19 12:23 AM   Result Value Ref Range    Glucose (POC) 145 (H) 65 - 100 mg/dL   CBC WITH AUTOMATED DIFF    Collection Time: 10/18/19  5:56 AM   Result Value Ref Range    WBC 4.5 4.3 - 11.1 K/uL    RBC 2.83 (L) 4.05 - 5.2 M/uL    HGB 8.0 (L) 11.7 - 15.4 g/dL    HCT 26.2 (L) 35.8 - 46.3 %    MCV 92.6 79.6 - 97.8 FL    MCH 28.3 26.1 - 32.9 PG    MCHC 30.5 (L) 31.4 - 35.0 g/dL    RDW 16.1 (H) 11.9 - 14.6 %    PLATELET 137 687 - 900 K/uL    MPV 11.6 9.4 - 12.3 FL    ABSOLUTE NRBC 0.00 0.0 - 0.2 K/uL    DF AUTOMATED      NEUTROPHILS 54 43 - 78 %    LYMPHOCYTES 35 13 - 44 %    MONOCYTES 9 4.0 - 12.0 %    EOSINOPHILS 2 0.5 - 7.8 %    BASOPHILS 0 0.0 - 2.0 %    IMMATURE GRANULOCYTES 0 0.0 - 5.0 %    ABS. NEUTROPHILS 2.4 1.7 - 8.2 K/UL    ABS. LYMPHOCYTES 1.6 0.5 - 4.6 K/UL    ABS. MONOCYTES 0.4 0.1 - 1.3 K/UL    ABS. EOSINOPHILS 0.1 0.0 - 0.8 K/UL    ABS. BASOPHILS 0.0 0.0 - 0.2 K/UL    ABS. IMM. GRANS. 0.0 0.0 - 0.5 K/UL   METABOLIC PANEL, COMPREHENSIVE    Collection Time: 10/18/19  5:56 AM   Result Value Ref Range    Sodium 139 136 - 145 mmol/L    Potassium 4.2 3.5 - 5.1 mmol/L    Chloride 108 (H) 98 - 107 mmol/L    CO2 21 21 - 32 mmol/L    Anion gap 10 7 - 16 mmol/L    Glucose 81 65 - 100 mg/dL    BUN 26 (H) 6 - 23 MG/DL    Creatinine 7.41 (H) 0.6 - 1.0 MG/DL    GFR est AA 8 (L) >60 ml/min/1.73m2    GFR est non-AA 6 (L) >60 ml/min/1.73m2    Calcium 8.8 8.3 - 10.4 MG/DL    Bilirubin, total 1.6 (H) 0.2 - 1.1 MG/DL    ALT (SGPT) 13 12 - 65 U/L    AST (SGOT) 29 15 - 37 U/L    Alk.  phosphatase 146 (H) 50 - 136 U/L    Protein, total 5.4 (L) 6.3 - 8.2 g/dL    Albumin 2.6 (L) 3.5 - 5.0 g/dL    Globulin 2.8 2.3 - 3.5 g/dL    A-G Ratio 0.9 (L) 1.2 - 3.5          All Micro Results     Procedure Component Value Units Date/Time    C. DIFFICILE AG & TOXIN A/B [[id number]] Collected:  10/17/19 1543    Order Status:  Completed Specimen:  Stool Updated:  10/18/19 1132     GDH ANTIGEN       C. DIFFICILE GDH ANTIGEN-NEGATIVE           C. difficile toxin       C. DIFFICILE TOXIN-NEGATIVE           PCR Reflex NOT APPLICABLE        INTERPRETATION       NEGATIVE FOR TOXIGENIC C. DIFFICILE           Clinical Consideration       NEGATIVE FOR TOXIGENIC C. DIFFICILE          CULTURE, BLOOD [[id number]] Collected: 10/17/19 1803    Order Status:  Completed Specimen:  Blood Updated:  10/18/19 0904     Special Requests: --        LEFT  HAND       Culture result: NO GROWTH AFTER 13 HOURS       CULTURE, BLOOD [[id number]] Collected:  10/17/19 1916    Order Status:  Completed Specimen:  Blood Updated:  10/18/19 0904     Special Requests: --        LEFT  HAND       Culture result: NO GROWTH AFTER 13 HOURS       CULTURE, STOOL [[id number]] Collected:  10/17/19 1543    Order Status:  Completed Specimen:  Stool Updated:  10/18/19 0810     Special Requests: NO SPECIAL REQUESTS        Culture result:       NO GROWTH OF SALMONELLA OR SHIGELLA IS EVIDENT ON FIRST READING, FINAL REPORT TO FOLLOW AFTER FURTHER INCUBATION OF CULTURE          OVA & PARASITES, STOOL [[id number]] Collected:  10/17/19 1543    Order Status:  Completed Specimen:  Stool from Feces Updated:  10/17/19 1554          No results found for this visit on 10/16/19.     Current Meds:  Current Facility-Administered Medications   Medication Dose Route Frequency    [START ON 10/19/2019] pantoprazole (PROTONIX) tablet 40 mg  40 mg Oral ACB    epoetin florecita-epbx (RETACRIT) injection 10,000 Units  10,000 Units SubCUTAneous DIALYSIS MON, WED & FRI    LORazepam (ATIVAN) tablet 0.5 mg  0.5 mg Oral Q6H PRN    rOPINIRole (REQUIP) tablet 0.5 mg  0.5 mg Oral QHS    lidocaine-prilocaine (EMLA) 2.5-2.5 % cream   Topical PRN    famotidine (PEPCID) tablet 20 mg  20 mg Oral QHS    multivitamin w ZN (STRESSTABS W ZINC) tablet  1 Tab Oral BID    sodium chloride (NS) flush 5-40 mL  5-40 mL IntraVENous Q8H    sodium chloride (NS) flush 5-40 mL  5-40 mL IntraVENous PRN    NUTRITIONAL SUPPORT ELECTROLYTE PRN ORDERS   Does Not Apply PRN    acetaminophen (TYLENOL) tablet 650 mg  650 mg Oral Q4H PRN    HYDROcodone-acetaminophen (NORCO) 5-325 mg per tablet 1 Tab  1 Tab Oral Q4H PRN    morphine injection 2 mg  2 mg IntraVENous Q4H PRN    naloxone (NARCAN) injection 0.4 mg  0.4 mg IntraVENous PRN    bisacodyl (DULCOLAX) tablet 5 mg  5 mg Oral DAILY PRN    heparin (porcine) injection 5,000 Units  5,000 Units SubCUTAneous Q8H    dextrose 40% (GLUTOSE) oral gel 1 Tube  15 g Oral PRN    glucagon (GLUCAGEN) injection 1 mg  1 mg IntraMUSCular PRN    dextrose (D50W) injection syrg 12.5-25 g  25-50 mL IntraVENous PRN    tacrolimus (PROGRAF) capsule 3 mg  3 mg Oral DAILY    hydrOXYzine pamoate (VISTARIL) capsule 25 mg  25 mg Oral TID PRN    midodrine (PROAMITINE) tablet 10 mg  10 mg Oral TID WITH MEALS    rifAXIMin (XIFAXAN) tablet 550 mg  550 mg Oral BID    levothyroxine (SYNTHROID) tablet 175 mcg  175 mcg Oral 6am    mycophenolate mofetil (CELLCEPT) capsule 500 mg  500 mg Oral ACB&D    promethazine (PHENERGAN) with saline injection 12.5 mg  12.5 mg IntraVENous Q6H PRN       Other Studies (last 24 hours):  No results found. Assessment and Plan:     Hospital Problems as of 10/18/2019 Date Reviewed: 2/27/2018          Codes Class Noted - Resolved POA    Hypotension ICD-10-CM: I95.9  ICD-9-CM: 458.9  10/16/2019 - Present Unknown        Hypoglycemia ICD-10-CM: E16.2  ICD-9-CM: 251.2  10/16/2019 - Present Unknown              Plan:  · Orthostatic hypotension per change of HR: continued on midodrine, s/p albumin/ IVF boluses, no significant spx. · ESRD on HD: planned tomorrow  · Nausea/emesis and loose BM: consult GI, supportive Rx  · RLS: requip and ativan prn   · Hypoglycemia: monitor glucoses   · Liver transplant status: GI to evaluate, followed per [facility], continued cellcept/ prograf, check prograft level, continued xifaxin  · Anemia: taking retacrit  · Right pleural effusion: not symptomatic, likely hepatic hydrothorax, no need for acute thoracentesis       DC planning/Dispo: Hopefully tomorrow if BP tolerates dialysis challenge.       Diet:  DIET REGULAR  DVT ppx:  heparin    Signed:  Dmitri Garcia MD

## 2019-10-18 NOTE — PROGRESS NOTES
[facility] PROGRESS NOTE    Follow up for: ESRD on HD TTS at  hd unit     Subjective:   Patient seen and examined. Chart, notes, labs, imaging, results all reviewed.    Complains  Of nausea , which she has on and off after taking rifaximin     ROS:  Gen - no fever, no chills, appetite okay  CV - no chest pain, no orthopnea  Lung - no shortness of breath, no cough  Abd - no tenderness, no nausea, no vomiting  Ext - no edema    Objective:   Exam:  Vitals:    10/18/19 0351 10/18/19 0448 10/18/19 0719 10/18/19 1058   BP: 91/57  92/53 99/65   Pulse: 86  71 83   Resp: 20  20 20   Temp: 98.2 °F (36.8 °C)  98.2 °F (36.8 °C) 98.9 °F (37.2 °C)   SpO2: 92%  95% 96%   Weight:  78.2 kg (172 lb 4.8 oz)           Intake/Output Summary (Last 24 hours) at 10/18/2019 1107  Last data filed at 10/18/2019 1005  Gross per 24 hour   Intake 1080 ml   Output 1 ml   Net 1079 ml       Current Facility-Administered Medications   Medication Dose Route Frequency    epoetin florecita-epbx (RETACRIT) injection 10,000 Units  10,000 Units SubCUTAneous DIALYSIS MON, WED & FRI    LORazepam (ATIVAN) tablet 0.5 mg  0.5 mg Oral Q6H PRN    rOPINIRole (REQUIP) tablet 0.5 mg  0.5 mg Oral QHS    lidocaine-prilocaine (EMLA) 2.5-2.5 % cream   Topical PRN    famotidine (PEPCID) tablet 20 mg  20 mg Oral QHS    multivitamin w ZN (STRESSTABS W ZINC) tablet  1 Tab Oral BID    sodium chloride (NS) flush 5-40 mL  5-40 mL IntraVENous Q8H    sodium chloride (NS) flush 5-40 mL  5-40 mL IntraVENous PRN    NUTRITIONAL SUPPORT ELECTROLYTE PRN ORDERS   Does Not Apply PRN    acetaminophen (TYLENOL) tablet 650 mg  650 mg Oral Q4H PRN    HYDROcodone-acetaminophen (NORCO) 5-325 mg per tablet 1 Tab  1 Tab Oral Q4H PRN    morphine injection 2 mg  2 mg IntraVENous Q4H PRN    naloxone (NARCAN) injection 0.4 mg  0.4 mg IntraVENous PRN    bisacodyl (DULCOLAX) tablet 5 mg  5 mg Oral DAILY PRN    heparin (porcine) injection 5,000 Units  5,000 Units SubCUTAneous Q8H  dextrose 40% (GLUTOSE) oral gel 1 Tube  15 g Oral PRN    glucagon (GLUCAGEN) injection 1 mg  1 mg IntraMUSCular PRN    dextrose (D50W) injection syrg 12.5-25 g  25-50 mL IntraVENous PRN    tacrolimus (PROGRAF) capsule 3 mg  3 mg Oral DAILY    hydrOXYzine pamoate (VISTARIL) capsule 25 mg  25 mg Oral TID PRN    midodrine (PROAMITINE) tablet 10 mg  10 mg Oral TID WITH MEALS    rifAXIMin (XIFAXAN) tablet 550 mg  550 mg Oral BID    levothyroxine (SYNTHROID) tablet 175 mcg  175 mcg Oral 6am    mycophenolate mofetil (CELLCEPT) capsule 500 mg  500 mg Oral ACB&D    promethazine (PHENERGAN) with saline injection 12.5 mg  12.5 mg IntraVENous Q6H PRN       EXAM  GEN - Alert, oriented, in no distress  CV - S1, S2, RRR, no rub, murmur, or gallop  Lung - clear to auscultation bilaterally  Abd - soft, nontender, BS present  Ext - no edema    Recent Labs     10/18/19  0556 10/17/19  0320 10/16/19  1321   WBC 4.5 3.9* 3.1*   HGB 8.0* 7.6* 8.0*   HCT 26.2* 24.7* 26.8*    120* 112*        Recent Labs     10/18/19  0556 10/17/19  1805 10/17/19  0320 10/16/19  1321     --  137 135*   K 4.2  --  3.8 3.7   *  --  106 98   CO2 21  --  23 25   BUN 26*  --  20 19   CREA 7.41*  --  5.86* 5.66*   CA 8.8  --  8.5 8.9   GLU 81  --  96 160*   MG  --  2.4  --  2.3   PHOS  --   --   --  4.5       Assessment and Plan:     1. ESRD on HD TTS   She is euvolumic and the electrolytes are wnl . Will hold hd again today . Her last hd was on Tuesday . No urgent indication for hd today   Continue with midodrine   Advised her to go to her HD unit tomorrow for hd if she gets discharged today      2. S/p liver transplant   Continue with current IS      3. Anemia will do epo with HD      4. AMY Arias MD

## 2019-10-18 NOTE — PROGRESS NOTES
Patient told she was getting d/c and then patient become nausea. Patient called other individuals in hospital.  Once visitors came patient became more anxious and needed ativan. Both medications given per order. Will monitor.

## 2019-10-18 NOTE — DISCHARGE SUMMARY
Physician Discharge Summary     Patient ID:  Susu Chakraborty  [id number]  34 y.o.  1980    Admit date: 10/16/2019    Discharge date: 10-    Diagnosis:  1- Hypotension, transient, s/p large paracenthesis of 7.5L followed by albumin infusion. Improved and tolerated dialysis. Hx of DALY and liver transplant. 2- ESRD, on dialysis. 3- Nausea, non specific, seen by Gi, started on Proton pump inhibitor. Resolved. Hospital course:   34 y.o. female with PMH of but not limited to DALY cirrhosis s/p liver transplant on Program and Cel[facility], Cardiac arrest in June 2019, ESRD following cardiac arrest requiring HD, Recurrent Ascites who is seen in consultation at the request of Dr. Romeo Cuevas for liver transplant, anemia, nausea, vomiting. Mrs. Obed Litten underwent large volume paracentesis on 10/16/19 with hypotension following this. She was also noted to be hypoglycemic with BG of 60. No history of DM. Labs on admission with TB 1.9, AST 38, ALT 14, , WBC 3.1, HGB 8.0, MCV 93. She has chronic hypotension and anemia. She has been having large volume paracentesis every 4 weeks. Mrs. Obed Litten reports a history of nausea and vomiting after eating which started a few months ago. This is occurring more frequently. Emesis is described as \"bile\". She denies any heartburn/GERD symptoms. She is currently taking Zantac 150mg PO QPM. Denies any NSAIDS. She reports having loose stools a few times daily. She has thought that this was related to Xifaxan. She is not on Lactulose. Denies any recent confusion. Denies any melena/hematochezia. Denies any abdominal pain. She reports a history of Gastric Bypass surgery for weight loss in 2009. She had a recent visit at [facility] in September 2019 where labs revealed HGB 8.6, , MCV 97. There was   Patient admitted and treated as above. On day of discharge, patient exam showed benign abdomen and stable vitals after dialysis.     PCP: No primary care provider on file.    Patient Instructions:   Current Discharge Medication List      START taking these medications    Details   promethazine (PHENERGAN) 12.5 mg tablet Take 1 Tab by mouth every eight (8) hours as needed for Nausea. Qty: 30 Tab, Refills: 0         CONTINUE these medications which have NOT CHANGED    Details   levothyroxine (SYNTHROID) 175 mcg tablet Take 175 mcg by mouth Daily (before breakfast). mycophenolate (CELLCEPT) 500 mg tablet Take 250 mg by mouth two (2) times a day. midodrine (PROAMITINE) 10 mg tablet Take 10 mg by mouth three (3) times daily. raNITIdine (ZANTAC) 150 mg tablet Take 150 mg by mouth two (2) times a day. rifAXIMin (XIFAXAN) 550 mg tablet Take 550 mg by mouth two (2) times a day. potassium chloride SR (KLOR-CON 10) 10 mEq tablet Take 10 mEq by mouth two (2) times a day. zinc sulfate 220 mg tablet Take 220 mg by mouth two (2) times a day. SUMAtriptan (IMITREX) 50 mg tablet Take 1 tab PO at onset of migraine with large glass of water. May repeat 2 hours later if needed. No more than 2 tabs in a day  Qty: 12 Tab, Refills: 1    Associated Diagnoses: Intractable migraine without aura and without status migrainosus      tacrolimus (PROGRAF) 1 mg capsule Take 1 mg by mouth every twelve (12) hours. Indications: 4 tabs in the AM and 3 tabs in the PM      mycophenolate sodium (MYFORTIC) 360 mg delayed release tablet Take 360 mg by mouth two (2) times a day. Indications: 4 tabs 2 times a day      SODIUM BICARBONATE PO Take 10 mcg by mouth two (2) times a day. Instructions:     Dialysis tomorrow, follow up w/ Hepatology in [address] regarding Gi issues (not [address] [place] [address] team per my conversation with their MD), PCP and Hepatologist regularly, renal diet, activity as tolerated. Fall precautions    Condition: Stable. Time 35 min    Please send copy to primary physician.     Signed:  Haim Funez MD  10/18/2019  1:50 PM

## 2019-10-19 VITALS
OXYGEN SATURATION: 93 % | BODY MASS INDEX: 26.94 KG/M2 | DIASTOLIC BLOOD PRESSURE: 64 MMHG | WEIGHT: 172 LBS | TEMPERATURE: 98.2 F | SYSTOLIC BLOOD PRESSURE: 99 MMHG | HEART RATE: 88 BPM | RESPIRATION RATE: 19 BRPM

## 2019-10-19 LAB
ALBUMIN SERPL-MCNC: 2.4 G/DL (ref 3.5–5)
ALBUMIN/GLOB SERPL: 0.8 {RATIO} (ref 1.2–3.5)
ALP SERPL-CCNC: 150 U/L (ref 50–136)
ALT SERPL-CCNC: 15 U/L (ref 12–65)
ANION GAP SERPL CALC-SCNC: 12 MMOL/L (ref 7–16)
AST SERPL-CCNC: 41 U/L (ref 15–37)
BACTERIA SPEC CULT: NORMAL
BASOPHILS # BLD: 0.1 K/UL (ref 0–0.2)
BASOPHILS NFR BLD: 1 % (ref 0–2)
BILIRUB SERPL-MCNC: 1.5 MG/DL (ref 0.2–1.1)
BUN SERPL-MCNC: 29 MG/DL (ref 6–23)
CALCIUM SERPL-MCNC: 8.3 MG/DL (ref 8.3–10.4)
CHLORIDE SERPL-SCNC: 112 MMOL/L (ref 98–107)
CO2 SERPL-SCNC: 17 MMOL/L (ref 21–32)
CREAT SERPL-MCNC: 8.67 MG/DL (ref 0.6–1)
DIFFERENTIAL METHOD BLD: ABNORMAL
EOSINOPHIL # BLD: 0.1 K/UL (ref 0–0.8)
EOSINOPHIL NFR BLD: 2 % (ref 0.5–7.8)
ERYTHROCYTE [DISTWIDTH] IN BLOOD BY AUTOMATED COUNT: 16.1 % (ref 11.9–14.6)
GLOBULIN SER CALC-MCNC: 3.1 G/DL (ref 2.3–3.5)
GLUCOSE BLD STRIP.AUTO-MCNC: 78 MG/DL (ref 65–100)
GLUCOSE BLD STRIP.AUTO-MCNC: 82 MG/DL (ref 65–100)
GLUCOSE SERPL-MCNC: 82 MG/DL (ref 65–100)
HCT VFR BLD AUTO: 26.5 % (ref 35.8–46.3)
HGB BLD-MCNC: 8.4 G/DL (ref 11.7–15.4)
IMM GRANULOCYTES # BLD AUTO: 0.1 K/UL (ref 0–0.5)
IMM GRANULOCYTES NFR BLD AUTO: 1 % (ref 0–5)
LYMPHOCYTES # BLD: 1.9 K/UL (ref 0.5–4.6)
LYMPHOCYTES NFR BLD: 34 % (ref 13–44)
MCH RBC QN AUTO: 28.9 PG (ref 26.1–32.9)
MCHC RBC AUTO-ENTMCNC: 31.7 G/DL (ref 31.4–35)
MCV RBC AUTO: 91.1 FL (ref 79.6–97.8)
MONOCYTES # BLD: 0.5 K/UL (ref 0.1–1.3)
MONOCYTES NFR BLD: 9 % (ref 4–12)
NEUTS SEG # BLD: 2.8 K/UL (ref 1.7–8.2)
NEUTS SEG NFR BLD: 53 % (ref 43–78)
NRBC # BLD: 0 K/UL (ref 0–0.2)
PLATELET # BLD AUTO: 168 K/UL (ref 150–450)
PLATELET COMMENTS,PCOM: ADEQUATE
PMV BLD AUTO: 11.6 FL (ref 9.4–12.3)
POTASSIUM SERPL-SCNC: 4.6 MMOL/L (ref 3.5–5.1)
PROT SERPL-MCNC: 5.5 G/DL (ref 6.3–8.2)
RBC # BLD AUTO: 2.91 M/UL (ref 4.05–5.2)
RBC MORPH BLD: ABNORMAL
SERVICE CMNT-IMP: NORMAL
SODIUM SERPL-SCNC: 141 MMOL/L (ref 136–145)
WBC # BLD AUTO: 5.5 K/UL (ref 4.3–11.1)

## 2019-10-19 PROCEDURE — 77030020263 HC SOL INJ SOD CL0.9% LFCR 1000ML

## 2019-10-19 PROCEDURE — 82962 GLUCOSE BLOOD TEST: CPT

## 2019-10-19 PROCEDURE — 74011000250 HC RX REV CODE- 250: Performed by: INTERNAL MEDICINE

## 2019-10-19 PROCEDURE — 74011250637 HC RX REV CODE- 250/637: Performed by: INTERNAL MEDICINE

## 2019-10-19 PROCEDURE — 74011250637 HC RX REV CODE- 250/637: Performed by: HOSPITALIST

## 2019-10-19 PROCEDURE — 90935 HEMODIALYSIS ONE EVALUATION: CPT

## 2019-10-19 PROCEDURE — 85025 COMPLETE CBC W/AUTO DIFF WBC: CPT

## 2019-10-19 PROCEDURE — 36416 COLLJ CAPILLARY BLOOD SPEC: CPT

## 2019-10-19 PROCEDURE — 74011250636 HC RX REV CODE- 250/636: Performed by: INTERNAL MEDICINE

## 2019-10-19 PROCEDURE — 74011250636 HC RX REV CODE- 250/636: Performed by: HOSPITALIST

## 2019-10-19 PROCEDURE — 5A1D70Z PERFORMANCE OF URINARY FILTRATION, INTERMITTENT, LESS THAN 6 HOURS PER DAY: ICD-10-PCS | Performed by: INTERNAL MEDICINE

## 2019-10-19 PROCEDURE — 80053 COMPREHEN METABOLIC PANEL: CPT

## 2019-10-19 PROCEDURE — 74011250637 HC RX REV CODE- 250/637: Performed by: PHYSICIAN ASSISTANT

## 2019-10-19 RX ORDER — HEPARIN SODIUM 5000 [USP'U]/ML
5000 INJECTION, SOLUTION INTRAVENOUS; SUBCUTANEOUS EVERY 12 HOURS
Status: DISCONTINUED | OUTPATIENT
Start: 2019-10-19 | End: 2019-10-19 | Stop reason: HOSPADM

## 2019-10-19 RX ORDER — PROMETHAZINE HYDROCHLORIDE 12.5 MG/1
12.5 TABLET ORAL
Qty: 30 TAB | Refills: 0 | Status: SHIPPED | OUTPATIENT
Start: 2019-10-19 | End: 2020-05-19

## 2019-10-19 RX ADMIN — Medication 10 ML: at 05:07

## 2019-10-19 RX ADMIN — PROMETHAZINE HYDROCHLORIDE 12.5 MG: 25 INJECTION INTRAMUSCULAR; INTRAVENOUS at 02:56

## 2019-10-19 RX ADMIN — LORAZEPAM 0.5 MG: 0.5 TABLET ORAL at 12:03

## 2019-10-19 RX ADMIN — PROMETHAZINE HYDROCHLORIDE 12.5 MG: 25 INJECTION INTRAMUSCULAR; INTRAVENOUS at 10:42

## 2019-10-19 RX ADMIN — TACROLIMUS 3 MG: 1 CAPSULE ORAL at 14:06

## 2019-10-19 RX ADMIN — EPOETIN ALFA-EPBX 10000 UNITS: 10000 INJECTION, SOLUTION INTRAVENOUS; SUBCUTANEOUS at 14:21

## 2019-10-19 RX ADMIN — Medication 10 ML: at 14:20

## 2019-10-19 RX ADMIN — RIFAXIMIN 550 MG: 550 TABLET ORAL at 10:43

## 2019-10-19 RX ADMIN — HEPARIN SODIUM 5000 UNITS: 5000 INJECTION INTRAVENOUS; SUBCUTANEOUS at 05:00

## 2019-10-19 RX ADMIN — PANTOPRAZOLE SODIUM 40 MG: 40 TABLET, DELAYED RELEASE ORAL at 05:00

## 2019-10-19 RX ADMIN — MIDODRINE HYDROCHLORIDE 10 MG: 5 TABLET ORAL at 09:32

## 2019-10-19 RX ADMIN — HYDROXYZINE PAMOATE 25 MG: 25 CAPSULE ORAL at 02:07

## 2019-10-19 RX ADMIN — MIDODRINE HYDROCHLORIDE 10 MG: 5 TABLET ORAL at 12:03

## 2019-10-19 RX ADMIN — LORAZEPAM 0.5 MG: 0.5 TABLET ORAL at 04:59

## 2019-10-19 RX ADMIN — LEVOTHYROXINE SODIUM 175 MCG: 100 TABLET ORAL at 05:00

## 2019-10-19 RX ADMIN — MYCOPHENOLATE MOFETIL 500 MG: 250 CAPSULE ORAL at 05:00

## 2019-10-19 NOTE — PROGRESS NOTES
Seen per discharged nurse. Discharged instruction given and IV removed by discharge nurse prior to leaving. Discharged home via wheelchair and CNA assisting with transport.

## 2019-10-19 NOTE — DIALYSIS
TRANSFER IN - DIALYSIS    Received patient in dialysis unit  from [facility] (unit) for ordered procedure. Consent verified for renal replacement therapy. Patient alert and vital signs stable. BP 85/50 P 88    Hemodialysis initiated using Right CVC. Aspirated and flushed both ports without difficulty. Dressing clean, dry and intact. Machine settings per MD order. Heparin 0 unit bolus and 0 units/hr. Will monitor during treatment.

## 2019-10-19 NOTE — PROGRESS NOTES
[facility] PROGRESS NOTE    Follow up for: ESRD on HD TTS at [facility] hd unit     Subjective:   Patient seen and examined. in dialysis unit , complains of restlessness       ROS:  Gen - no fever, no chills, appetite okay  CV - no chest pain, no orthopnea  Lung - no shortness of breath, no cough  Abd - no tenderness, no nausea, no vomiting  Ext - no edema    Objective:   Exam:  Vitals:    10/19/19 0955 10/19/19 1040 10/19/19 1058 10/19/19 1127   BP: (!) 85/50 109/68 99/64 91/50   Pulse: 88 86 96 98   Resp:       Temp:       SpO2:       Weight:             Intake/Output Summary (Last 24 hours) at 10/19/2019 1139  Last data filed at 10/19/2019 0403  Gross per 24 hour   Intake 480 ml   Output --   Net 480 ml       Current Facility-Administered Medications   Medication Dose Route Frequency    heparin (porcine) injection 5,000 Units  5,000 Units SubCUTAneous Q12H    pantoprazole (PROTONIX) tablet 40 mg  40 mg Oral ACB    epoetin florecita-epbx (RETACRIT) injection 10,000 Units  10,000 Units SubCUTAneous DIALYSIS MON, WED & FRI    LORazepam (ATIVAN) tablet 0.5 mg  0.5 mg Oral Q6H PRN    rOPINIRole (REQUIP) tablet 0.5 mg  0.5 mg Oral QHS    lidocaine-prilocaine (EMLA) 2.5-2.5 % cream   Topical PRN    famotidine (PEPCID) tablet 20 mg  20 mg Oral QHS    multivitamin w ZN (STRESSTABS W ZINC) tablet  1 Tab Oral BID    sodium chloride (NS) flush 5-40 mL  5-40 mL IntraVENous Q8H    sodium chloride (NS) flush 5-40 mL  5-40 mL IntraVENous PRN    NUTRITIONAL SUPPORT ELECTROLYTE PRN ORDERS   Does Not Apply PRN    acetaminophen (TYLENOL) tablet 650 mg  650 mg Oral Q4H PRN    HYDROcodone-acetaminophen (NORCO) 5-325 mg per tablet 1 Tab  1 Tab Oral Q4H PRN    morphine injection 2 mg  2 mg IntraVENous Q4H PRN    naloxone (NARCAN) injection 0.4 mg  0.4 mg IntraVENous PRN    bisacodyl (DULCOLAX) tablet 5 mg  5 mg Oral DAILY PRN    dextrose 40% (GLUTOSE) oral gel 1 Tube  15 g Oral PRN    glucagon (GLUCAGEN) injection 1 mg  1 mg IntraMUSCular PRN    dextrose (D50W) injection syrg 12.5-25 g  25-50 mL IntraVENous PRN    tacrolimus (PROGRAF) capsule 3 mg  3 mg Oral DAILY    hydrOXYzine pamoate (VISTARIL) capsule 25 mg  25 mg Oral TID PRN    midodrine (PROAMITINE) tablet 10 mg  10 mg Oral TID WITH MEALS    rifAXIMin (XIFAXAN) tablet 550 mg  550 mg Oral BID    levothyroxine (SYNTHROID) tablet 175 mcg  175 mcg Oral 6am    mycophenolate mofetil (CELLCEPT) capsule 500 mg  500 mg Oral ACB&D    promethazine (PHENERGAN) with saline injection 12.5 mg  12.5 mg IntraVENous Q6H PRN       EXAM  GEN - Alert, oriented, in no distress  CV - S1, S2, RRR, no rub, murmur, or gallop  Lung - clear to auscultation bilaterally  Abd - soft, nontender, BS present  Ext - no edema    Recent Labs     10/19/19  0603 10/18/19  0556 10/17/19  0320   WBC 5.5 4.5 3.9*   HGB 8.4* 8.0* 7.6*   HCT 26.5* 26.2* 24.7*    174 120*        Recent Labs     10/19/19  0603 10/18/19  0556 10/17/19  1805 10/17/19  0320 10/16/19  1321    139  --  137 135*   K 4.6 4.2  --  3.8 3.7   * 108*  --  106 98   CO2 17* 21  --  23 25   BUN 29* 26*  --  20 19   CREA 8.67* 7.41*  --  5.86* 5.66*   CA 8.3 8.8  --  8.5 8.9   GLU 82 81  --  96 160*   MG  --   --  2.4  --  2.3   PHOS  --   --   --   --  4.5       Assessment and Plan:     1. ESRD on HD TTS   Seen during HD - BP borderline low , complains of restlessness   Continue with midodrine         2. S/p liver transplant   Continue with current IS      3. Anemia will do epo with HD      4. AMY Baker MD

## 2019-10-19 NOTE — PROGRESS NOTES
Hospitalist Note     Admit Date:  10/16/2019  6:31 PM   Name:  Luis Islas   Age:  34 y.o.  :  1980   MRN:  [id number]   PCP:  No primary care provider on file. Treatment Team: Attending Provider: Juan David Alexis DO; Consulting Provider: Herb Lui MD; Utilization Review: Mendez Niño RN; Care Manager: Lillie Wolf; Student Nurse: Mazin Bazan; Care Manager: Maryana Hammond RN; Primary Nurse: Rigo Pastor RN    HPI/Subjective:     Ms. Georgiana Tijerina is a 33 yo female with PMH of liver transplant for DALY cirrhosis followed by [place], ESRD on dialysis, cardiac arrest admitted with hypotension, nausea, emesis. She is due to see [facility] in 2 months. She had outpatient paracentesis at [facility] on 10-16-19 removing 8 L of fluid. She was doing well after her procedure until she had acute onset of confusion, nausea, emesis, headache. She was hypotensive upon ED arrival and received IVF boluses and albumin. She did have albumin post paracentesis as well. She takes midodrine chronically. She additionally had hypoglycemia that has improved. CT head negative, CXR shows moderate pleural effusion and atelectasis/ consolidation. She thinks she recalls having a prior effusion when at [facility]. Nephrology to continue with HD needs.      Today, SBP  close to baseline per pt, no nausea, dialysis today    Objective:     Patient Vitals for the past 24 hrs:   Temp Pulse Resp BP SpO2   10/19/19 1058 -- 96 -- 99/64 --   10/19/19 1040 -- 86 -- 109/68 --   10/19/19 0955 -- 88 -- (!) 85/50 --   10/19/19 0927 -- -- 18 (!) 88/53 --   10/19/19 0714 98.4 °F (36.9 °C) 93 18 110/69 93 %   10/19/19 0335 98.5 °F (36.9 °C) 100 18 96/58 93 %   10/19/19 0045 -- 98 -- -- --   10/18/19 2240 98.9 °F (37.2 °C) (!) 105 20 93/54 94 %   10/18/19 1944 -- -- -- 98/58 --   10/18/19 1815 98.8 °F (37.1 °C) 92 20 90/50 94 %   10/18/19 1633 -- 97 -- 93/53 --   10/18/19 1558 -- -- -- 92/48 --   10/18/19 1146 -- 94 -- 90/57 --     Oxygen Therapy  O2 Sat (%): 93 % (10/19/19 0714)  Pulse via Oximetry: 85 beats per minute (10/17/19 1713)  O2 Device: Room air (10/18/19 0120)    Estimated body mass index is 26.94 kg/m² as calculated from the following:    Height as of an earlier encounter on 10/16/19: 5' 7\" (1.702 m). Weight as of this encounter: 78 kg (172 lb). Intake/Output Summary (Last 24 hours) at 10/19/2019 1110  Last data filed at 10/19/2019 0403  Gross per 24 hour   Intake 480 ml   Output --   Net 480 ml       *Note that automatically entered I/Os may not be accurate; dependent on patient compliance with collection and accurate  by techs. General:    Well nourished. Alert. Pale, mild distress   CV:   RRR. No murmur, rub, or gallop. No edema  Lungs:   CTAB. No wheezing, rhonchi, or rales. Abdomen:   Soft, nontender, nondistended. Extremities: Warm and dry  Skin:     No rashes or jaundice. Neuro:  No gross focal deficits    Data Review:  I have reviewed all labs, meds, and studies from the last 24 hours:    Recent Results (from the past 24 hour(s))   GLUCOSE, POC    Collection Time: 10/18/19 10:39 PM   Result Value Ref Range    Glucose (POC) 116 (H) 65 - 100 mg/dL   GLUCOSE, POC    Collection Time: 10/19/19  4:29 AM   Result Value Ref Range    Glucose (POC) 82 65 - 100 mg/dL   CBC WITH AUTOMATED DIFF    Collection Time: 10/19/19  6:03 AM   Result Value Ref Range    WBC 5.5 4.3 - 11.1 K/uL    RBC 2.91 (L) 4.05 - 5.2 M/uL    HGB 8.4 (L) 11.7 - 15.4 g/dL    HCT 26.5 (L) 35.8 - 46.3 %    MCV 91.1 79.6 - 97.8 FL    MCH 28.9 26.1 - 32.9 PG    MCHC 31.7 31.4 - 35.0 g/dL    RDW 16.1 (H) 11.9 - 14.6 %    PLATELET 370 975 - 018 K/uL    MPV 11.6 9.4 - 12.3 FL    ABSOLUTE NRBC 0.00 0.0 - 0.2 K/uL    NEUTROPHILS 53 43 - 78 %    LYMPHOCYTES 34 13 - 44 %    MONOCYTES 9 4.0 - 12.0 %    EOSINOPHILS 2 0.5 - 7.8 %    BASOPHILS 1 0.0 - 2.0 %    IMMATURE GRANULOCYTES 1 0.0 - 5.0 %    ABS.  NEUTROPHILS 2.8 1.7 - 8.2 K/UL    ABS. LYMPHOCYTES 1.9 0.5 - 4.6 K/UL    ABS. MONOCYTES 0.5 0.1 - 1.3 K/UL    ABS. EOSINOPHILS 0.1 0.0 - 0.8 K/UL    ABS. BASOPHILS 0.1 0.0 - 0.2 K/UL    ABS. IMM. GRANS. 0.1 0.0 - 0.5 K/UL    RBC COMMENTS SLIGHT  ANISOCYTOSIS + POIKILOCYTOSIS        PLATELET COMMENTS ADEQUATE      DF AUTOMATED     METABOLIC PANEL, COMPREHENSIVE    Collection Time: 10/19/19  6:03 AM   Result Value Ref Range    Sodium 141 136 - 145 mmol/L    Potassium 4.6 3.5 - 5.1 mmol/L    Chloride 112 (H) 98 - 107 mmol/L    CO2 17 (L) 21 - 32 mmol/L    Anion gap 12 7 - 16 mmol/L    Glucose 82 65 - 100 mg/dL    BUN 29 (H) 6 - 23 MG/DL    Creatinine 8.67 (H) 0.6 - 1.0 MG/DL    GFR est AA 7 (L) >60 ml/min/1.73m2    GFR est non-AA 5 (L) >60 ml/min/1.73m2    Calcium 8.3 8.3 - 10.4 MG/DL    Bilirubin, total 1.5 (H) 0.2 - 1.1 MG/DL    ALT (SGPT) 15 12 - 65 U/L    AST (SGOT) 41 (H) 15 - 37 U/L    Alk. phosphatase 150 (H) 50 - 136 U/L    Protein, total 5.5 (L) 6.3 - 8.2 g/dL    Albumin 2.4 (L) 3.5 - 5.0 g/dL    Globulin 3.1 2.3 - 3.5 g/dL    A-G Ratio 0.8 (L) 1.2 - 3.5     GLUCOSE, POC    Collection Time: 10/19/19 10:39 AM   Result Value Ref Range    Glucose (POC) 78 65 - 100 mg/dL        All Micro Results     Procedure Component Value Units Date/Time    CULTURE, STOOL [[id number]] Collected:  10/17/19 1543    Order Status:  Completed Specimen:  Stool Updated:  10/19/19 0810     Special Requests: NO SPECIAL REQUESTS        Culture result:       No Salmonella, Shigella, or Ecoli 0157 isolated.           CULTURE, BLOOD [[id number]] Collected:  10/17/19 1803    Order Status:  Completed Specimen:  Blood Updated:  10/19/19 0618     Special Requests: --        LEFT  HAND       Culture result: NO GROWTH 2 DAYS       CULTURE, BLOOD [[id number]] Collected:  10/17/19 1916    Order Status:  Completed Specimen:  Blood Updated:  10/19/19 0618     Special Requests: --        LEFT  HAND       Culture result: NO GROWTH 2 DAYS       C. DIFFICILE AG & TOXIN A/B [[id number]] Collected:  10/17/19 1543    Order Status:  Completed Specimen:  Stool Updated:  10/18/19 1132     [address] ANTIGEN       C. DIFFICILE GDH ANTIGEN-NEGATIVE           C. difficile toxin       C. DIFFICILE TOXIN-NEGATIVE           PCR Reflex NOT APPLICABLE        INTERPRETATION       NEGATIVE FOR TOXIGENIC C. DIFFICILE           Clinical Consideration       NEGATIVE FOR TOXIGENIC C. DIFFICILE          OVA & PARASITES, STOOL [[id number]] Collected:  10/17/19 1543    Order Status:  Completed Specimen:  Stool from Feces Updated:  10/17/19 7586          No results found for this visit on 10/16/19.     Current Meds:  Current Facility-Administered Medications   Medication Dose Route Frequency    heparin (porcine) injection 5,000 Units  5,000 Units SubCUTAneous Q12H    pantoprazole (PROTONIX) tablet 40 mg  40 mg Oral ACB    epoetin florecita-epbx (RETACRIT) injection 10,000 Units  10,000 Units SubCUTAneous DIALYSIS MON, WED & FRI    LORazepam (ATIVAN) tablet 0.5 mg  0.5 mg Oral Q6H PRN    rOPINIRole (REQUIP) tablet 0.5 mg  0.5 mg Oral QHS    lidocaine-prilocaine (EMLA) 2.5-2.5 % cream   Topical PRN    famotidine (PEPCID) tablet 20 mg  20 mg Oral QHS    multivitamin w ZN (STRESSTABS W ZINC) tablet  1 Tab Oral BID    sodium chloride (NS) flush 5-40 mL  5-40 mL IntraVENous Q8H    sodium chloride (NS) flush 5-40 mL  5-40 mL IntraVENous PRN    NUTRITIONAL SUPPORT ELECTROLYTE PRN ORDERS   Does Not Apply PRN    acetaminophen (TYLENOL) tablet 650 mg  650 mg Oral Q4H PRN    HYDROcodone-acetaminophen (NORCO) 5-325 mg per tablet 1 Tab  1 Tab Oral Q4H PRN    morphine injection 2 mg  2 mg IntraVENous Q4H PRN    naloxone (NARCAN) injection 0.4 mg  0.4 mg IntraVENous PRN    bisacodyl (DULCOLAX) tablet 5 mg  5 mg Oral DAILY PRN    dextrose 40% (GLUTOSE) oral gel 1 Tube  15 g Oral PRN    glucagon (GLUCAGEN) injection 1 mg  1 mg IntraMUSCular PRN    dextrose (D50W) injection syrg 12.5-25 g  25-50 mL IntraVENous PRN    tacrolimus (PROGRAF) capsule 3 mg  3 mg Oral DAILY    hydrOXYzine pamoate (VISTARIL) capsule 25 mg  25 mg Oral TID PRN    midodrine (PROAMITINE) tablet 10 mg  10 mg Oral TID WITH MEALS    rifAXIMin (XIFAXAN) tablet 550 mg  550 mg Oral BID    levothyroxine (SYNTHROID) tablet 175 mcg  175 mcg Oral 6am    mycophenolate mofetil (CELLCEPT) capsule 500 mg  500 mg Oral ACB&D    promethazine (PHENERGAN) with saline injection 12.5 mg  12.5 mg IntraVENous Q6H PRN       Other Studies (last 24 hours):  No results found. Assessment and Plan:     Hospital Problems as of 10/19/2019 Date Reviewed: 2/27/2018          Codes Class Noted - Resolved POA    Hypotension ICD-10-CM: I95.9  ICD-9-CM: 458.9  10/16/2019 - Present Unknown        Hypoglycemia ICD-10-CM: E16.2  ICD-9-CM: 251.2  10/16/2019 - Present Unknown              Plan:  · Orthostatic hypotension per change of HR: continued on midodrine, s/p albumin/ IVF boluses, no significant spx. · ESRD on HD: planned tomorrow  · Nausea/emesis and loose BM: consult GI, supportive Rx  · RLS: requip and ativan prn   · Hypoglycemia: monitor glucoses   · Liver transplant status: GI to evaluate, followed per [facility], continued cellcept/ prograf, check prograft level, continued xifaxin  · Anemia: taking retacrit  · Right pleural effusion: not symptomatic, likely hepatic hydrothorax, no need for acute thoracentesis       DC planning/Dispo: Hopefully today if BP tolerates dialysis challenge.       Diet:  DIET REGULAR  DVT ppx:  heparin    Signed:  Geno Molina MD

## 2019-10-19 NOTE — PROGRESS NOTES
Called to dialysis per Simon Nguyen, dialysis nurse for pt requesting phenergan and Xifaxan. Medicated as requested. Phenergan 12.5 IVP given. [facility] and to continue to monitor.

## 2019-10-19 NOTE — PROGRESS NOTES
Bedside report received from [place]. Assessment completed. Bed is in low and locked position with floor free of objects. Patient AxOx3, and resting quietly in bed. Pt complaining of nausea IV bad had to start new one to give PRN Phenergan. Respirations even and non labored. Verbalized understanding to call for needs. Call light within reach.

## 2019-10-19 NOTE — PROGRESS NOTES
Returned back to room from dialysis earlier. Last BP 99/64 post dialysis. Asking regarding discharge. Spoke with Dr. Lucille Sosa and made aware. New orders to discharge home today. Pt and family made aware. Awaiting to be discharged per MARY nurse.

## 2019-10-19 NOTE — DIALYSIS
TRANSFER OUT- DIALYSIS    Hemodialysis treatment completed without complications. Patient alert and VS stable  /67  P 96       1.5 Kgs removed. Flushed both ports with 10 mL of NS.  CVC dressing clean, dry, and intact, tego caps intact, bilateral lumens wrapped with 4x4 gauze. Meds given-None. No units of RBCs given during dialysis. Patient to [phone] after dialysis.

## 2019-10-19 NOTE — DISCHARGE INSTRUCTIONS
Patient Education        Low Blood Pressure: Care Instructions  Your Care Instructions    Blood pressure is a measurement of the force of the blood against the walls of the blood vessels during and after each beat of the heart. Low blood pressure is also called hypotension. It means that your blood pressure is much lower than normal. Some people, especially young, slim women, may have slightly low blood pressure without symptoms. But in many people, low blood pressure can cause symptoms such as feeling dizzy or lightheaded. When your blood pressure is too low, your heart, brain, and other organs do not get enough blood. Low blood pressure can be caused by many things, including heart problems and some medicines. Diabetes that is not under control can cause your blood pressure to drop. And so can a severe allergic reaction or infection. Another cause is dehydration, which is when your body loses too much fluid. Treatment for low blood pressure depends on the cause. Follow-up care is a key part of your treatment and safety. Be sure to make and go to all appointments, and call your doctor if you are having problems. It's also a good idea to know your test results and keep a list of the medicines you take. How can you care for yourself at home? · Drink plenty of fluids, enough so that your urine is light yellow or clear like water. If you have kidney, heart, or liver disease and have to limit fluids, talk with your doctor before you increase the amount of fluids you drink. · Be safe with medicines. Call your doctor if you think you are having a problem with your medicine. You will get more details on the specific medicines your doctor prescribes. · Stand up or get out of bed very slowly to allow your body to adjust.  · Get plenty of rest.  · Do not smoke. Smoking increases your risk of heart attack. If you need help quitting, talk to your doctor about stop-smoking programs and medicines.  These can increase your chances of quitting for good. · Limit alcohol to 2 drinks a day for men and 1 drink a day for women. Alcohol may interfere with your medicine. In addition, alcohol can make your low blood pressure worse by causing your body to lose water. When should you call for help? Call 911 anytime you think you may need emergency care. For example, call if:    · You passed out (lost consciousness).    Call your doctor now or seek immediate medical care if:    · You are dizzy or lightheaded, or you feel like you may faint.    Watch closely for changes in your health, and be sure to contact your doctor if you have any problems. Where can you learn more? Go to http://thelma-rachel.info/. Enter C304 in the search box to learn more about \"Low Blood Pressure: Care Instructions. \"  Current as of: April 9, 2019  Content Version: 12.2  © 7696-2027 [employer]. Care instructions adapted under license by [employer] (which disclaims liability or warranty for this information). If you have questions about a medical condition or this instruction, always ask your healthcare professional. [place] [address] any warranty or liability for your use of this information. Patient Education        Hypoglycemia: Care Instructions  Your Care Instructions    Hypoglycemia means that your blood sugar is low and your body is not getting enough fuel. Some people get low blood sugar from not eating often enough. Some medicines to treat diabetes can cause low blood sugar. People who have had surgery on their stomachs or intestines may get hypoglycemia. Problems with the pancreas, kidneys, or liver also can cause low blood sugar. A snack or drink with sugar in it will raise your blood sugar and should ease your symptoms right away. Your doctor may recommend that you change or stop your medicines until you can get your blood sugar levels under control.  In the long run, you may need to change your diet and eating habits so that you get enough fuel for your body throughout the day. Follow-up care is a key part of your treatment and safety. Be sure to make and go to all appointments, and call your doctor if you are having problems. It's also a good idea to know your test results and keep a list of the medicines you take. How can you care for yourself at home? · Learn to recognize the early signs of low blood sugar. Signs include:  ? Nausea. ? Hunger. ? Feeling nervous, irritable, or shaky. ? Cold, clammy, wet skin. ? Sweating (when you are not exercising). ? A fast heartbeat.  ? Numbness or tingling of the fingertips or lips. · If you feel an episode of low blood sugar coming on, drink fruit juice or sugared (not diet) soda, or eat sugar in the form of candy, cubes, or tablets. [employer] [employer] are another [employer]. · Eat small, frequent meals so that you do not get too hungry between meals. · Balance extra exercise with eating more. · Keep a written record of your low blood sugar episodes, including when you last ate and what you ate, so that you can learn what causes your blood sugar to drop. · Make sure your family, friends, and coworkers know the symptoms of low blood sugar and know what to do to get your sugar level up. · Wear medical alert jewelry that lists your condition. You can buy this at most drugstores. When should you call for help? Call 911 anytime you think you may need emergency care. For example, call if:    · You passed out (lost consciousness).     · You are confused or cannot think clearly.     · Your blood sugar is very high or very low.    Watch closely for changes in your health, and be sure to contact your doctor if:    · Your blood sugar stays outside the level your doctor set for you.     · You have any problems. Where can you learn more? Go to http://thelma-rachel.info/.   Enter S059 in the search box to learn more about \"Hypoglycemia: Care Instructions. \"  Current as of: April 16, 2019  Content Version: 12.2  © 7700-6924 [employer]). Care instructions adapted under license by [employer] (which disclaims liability or warranty for this information). If you have questions about a medical condition or this instruction, always ask your healthcare professional. [address] any warranty or liability for your use of this information. Patient Education        Nausea and Vomiting: Care Instructions  Your Care Instructions    When you are nauseated, you may feel weak and sweaty and notice a lot of saliva in your mouth. Nausea often leads to vomiting. Most of the time you do not need to worry about nausea and vomiting, but they can be signs of other illnesses. Two common causes of nausea and vomiting are stomach flu and food poisoning. Nausea and vomiting from viral stomach flu will usually start to improve within 24 hours. Nausea and vomiting from food poisoning may last from 12 to 48 hours. The doctor has checked you carefully, but problems can develop later. If you notice any problems or new symptoms, get medical treatment right away. Follow-up care is a key part of your treatment and safety. Be sure to make and go to all appointments, and call your doctor if you are having problems. It's also a good idea to know your test results and keep a list of the medicines you take. How can you care for yourself at home? · To prevent dehydration, drink plenty of fluids, enough so that your urine is light yellow or clear like water. Choose water and other caffeine-free clear liquids until you feel better. If you have kidney, heart, or liver disease and have to limit fluids, talk with your doctor before you increase the amount of fluids you drink. · Rest in bed until you feel better.   · When you are able to eat, try clear soups, mild foods, and liquids until all symptoms are gone for 12 to 48 hours. Other good choices include dry toast, crackers, cooked cereal, and gelatin dessert, such as Jell-O. When should you call for help? Call 911 anytime you think you may need emergency care. For example, call if:    · You passed out (lost consciousness).    Call your doctor now or seek immediate medical care if:    · You have symptoms of dehydration, such as:  ? Dry eyes and a dry mouth. ? Passing only a little dark urine. ? Feeling thirstier than usual.     · You have new or worsening belly pain.     · You have a new or higher fever.     · You vomit blood or what looks like coffee grounds.    Watch closely for changes in your health, and be sure to contact your doctor if:    · You have ongoing nausea and vomiting.     · Your vomiting is getting worse.     · Your vomiting lasts longer than 2 days.     · You are not getting better as expected. Where can you learn more? Go to http://thelma-rachel.info/. Enter 25 [id number] in the search box to learn more about \"Nausea and Vomiting: Care Instructions. \"  Current as of: June 26, 2019  Content Version: 12.2  © 2725-5571 [employer]. Care instructions adapted under license by [employer] (which disclaims liability or warranty for this information). If you have questions about a medical condition or this instruction, always ask your healthcare professional. [address] any warranty or liability for your use of this information.          ___________________________________________________________________________________________________________________________________

## 2019-10-20 LAB — TACROLIMUS BLD-MCNC: 12.4 NG/ML (ref 2–20)

## 2019-10-21 LAB
G LAMBLIA AG STL QL IA: NEGATIVE
SPECIMEN SOURCE: NORMAL

## 2019-10-22 LAB
BACTERIA SPEC CULT: NORMAL
BACTERIA SPEC CULT: NORMAL
SERVICE CMNT-IMP: NORMAL
SERVICE CMNT-IMP: NORMAL

## 2019-10-24 LAB
O+P SPEC MICRO: NORMAL
O+P STL CONC: NORMAL
SPECIMEN SOURCE: NORMAL

## 2020-05-19 PROBLEM — Z01.818 PRE-OP EXAM: Status: ACTIVE | Noted: 2020-05-19

## 2020-05-22 NOTE — PROGRESS NOTES
Screening for COVID-19 During Preassessment    1) Do you currently have signs or symptoms of a respiratory infection, such as fever, cough, shortness of breath or sore throat?  no    2) In the last 14 days have you had contact with any of the following:   A) Someone with confirmed or presumptive diagnosis of COVID-19? NO    Or B) Someone under investigation for COVID-19? NO    Or  C) Someone who has been diagnosed with COVID-19? NO    3) In the last 14 days have you traveled or has someone in your home traveled to [place], [facility], [place], [place], [place], [place], [place], or [place]?    No

## 2020-05-26 ENCOUNTER — HOSPITAL ENCOUNTER (OUTPATIENT)
Dept: INTERVENTIONAL RADIOLOGY/VASCULAR | Age: 40
Discharge: HOME | End: 2020-05-26
Attending: INTERNAL MEDICINE
Payer: MEDICARE

## 2020-05-26 VITALS
DIASTOLIC BLOOD PRESSURE: 54 MMHG | HEART RATE: 97 BPM | SYSTOLIC BLOOD PRESSURE: 96 MMHG | RESPIRATION RATE: 18 BRPM | TEMPERATURE: 98.3 F | OXYGEN SATURATION: 99 %

## 2020-05-26 DIAGNOSIS — R18.8 ASCITES: ICD-10-CM

## 2020-05-26 PROCEDURE — P9047 ALBUMIN (HUMAN), 25%, 50ML: HCPCS | Performed by: PHYSICIAN ASSISTANT

## 2020-05-26 PROCEDURE — 77030014146 HC TY THORCENT/PARACENT BD -B

## 2020-05-26 PROCEDURE — 49083 ABD PARACENTESIS W/IMAGING: CPT

## 2020-05-26 PROCEDURE — 77030010507 HC ADH SKN DERMBND J&J -B

## 2020-05-26 PROCEDURE — 74011250636 HC RX REV CODE- 250/636: Performed by: PHYSICIAN ASSISTANT

## 2020-05-26 RX ORDER — ALBUMIN HUMAN 250 G/1000ML
25 SOLUTION INTRAVENOUS ONCE
Status: COMPLETED | OUTPATIENT
Start: 2020-05-26 | End: 2020-05-26

## 2020-05-26 RX ORDER — MORPHINE SULFATE 2 MG/ML
2 INJECTION, SOLUTION INTRAMUSCULAR; INTRAVENOUS ONCE
Status: COMPLETED | OUTPATIENT
Start: 2020-05-26 | End: 2020-05-26

## 2020-05-26 RX ADMIN — ALBUMIN (HUMAN) 25 G: 0.25 INJECTION, SOLUTION INTRAVENOUS at 08:38

## 2020-05-26 RX ADMIN — MORPHINE SULFATE 2 MG: 2 INJECTION, SOLUTION INTRAMUSCULAR; INTRAVENOUS at 09:26

## 2020-05-26 NOTE — DISCHARGE INSTRUCTIONS
[address]  Department of Interventional Radiology  [address] [place] [address] [address] [facility]  [phone] Office  [phone] Fax    [facility] DISCHARGE INSTRUCTIONS    General Information:  During this procedure, the doctor will insert a needle into the abdomen to drain fluid. After the procedure, you will be able to take a deep breath much easier. The site of the puncture may ooze the first day. This will decrease and eventually stop. Paracentesis (draining fluid from the abdomen) sometimes makes patients hypotensive (low blood pressure). Your doctor may order for you to receive fluids or albumin (a volume booster) during the procedure through an IV site. Home Care Instructions:  Keep the puncture site clean and dry. No tub baths or swimming until puncture site heals. Showering is acceptable. Resume your normal diet, and resume your normal activity slowly and as you tolerate. If you are short of breath, rest. If shortness of breath does not ease, please call your ordering doctor. Fluid can re-accumulate in the chest and/or in the abdomen. If this should occur, your doctor needs to know as you may need to have the procedure done again. Call If:     You should call your Physician and/or the Radiology Nurse if you notice any signs of infection, like pus draining, or if it is swollen or reddened. Also call if you have a fever, or if you are bleeding from the puncture site more than a small amount on the dressing. Call if the puncture site keeps draining fluid. Some oozing is to be expected, but should slow and then stop. Call if you feel like you have pressure in your abdomen. SEEK IMMEDIATE CARE OR CALL 911 IF YOU SUDDENLY HAVE TROUBLE BREATHING, OR IF YOUR LIPS TURN BLUE, OR IF YOU NOTICE BLOOD IN YOUR SPUTUM. Follow-Up Instructions: Please see your ordering doctor as he/she has requested. To Reach Us:   If you have any questions about your procedure, please call the Interventional Radiology department at [phone]. After business hours (5pm) and weekends, call the answering service at [phone] and ask for the Radiologist on call to be paged. Interventional Radiology General Nurse Discharge    After general anesthesia or intravenous sedation, for 24 hours or while taking prescription Narcotics:  · Limit your activities  · Do not drive and operate hazardous machinery  · Do not make important personal or business decisions  · Do  not drink alcoholic beverages  · If you have not urinated within 8 hours after discharge, please contact your surgeon on call. * Please give a list of your current medications to your Primary Care Provider. * Please update this list whenever your medications are discontinued, doses are     changed, or new medications (including over-the-counter products) are added. * Please carry medication information at all times in case of emergency situations. These are general instructions for a healthy lifestyle:    No smoking/ No tobacco products/ Avoid exposure to second hand smoke  Surgeon General's Warning:  Quitting smoking now greatly reduces serious risk to your health. Obesity, smoking, and sedentary lifestyle greatly increases your risk for illness  A healthy diet, regular physical exercise & weight monitoring are important for maintaining a healthy lifestyle    You may be retaining fluid if you have a history of heart failure or if you experience any of the following symptoms:  Weight gain of 3 pounds or more overnight or 5 pounds in a week, increased swelling in our hands or feet or shortness of breath while lying flat in bed. Please call your doctor as soon as you notice any of these symptoms; do not wait until your next office visit.     Recognize signs and symptoms of STROKE:  F-face looks uneven    A-arms unable to move or move unevenly    S-speech slurred or non-existent    T-time-call 911 as soon as signs and symptoms begin-DO NOT go       Back to bed or wait to see if you get better-TIME IS BRAIN.           Date: 5/26/2020  Discharging Nurse: Renea Hicks RN

## 2020-05-26 NOTE — PROGRESS NOTES
2mg of Morphine [facility]

## 2020-05-26 NOTE — PROGRESS NOTES
Discharge instructions reviewed with pt; questions addressed. PIV removed. Pt discharged to home with her mother.

## 2020-05-26 NOTE — PROGRESS NOTES
7060ml of fluid removed. 25 gm of Albumin administered. Catheter pulled, pressure held, skin glue applied. Pt tolerated procedure fairly well.

## 2020-06-11 PROBLEM — Z74.09 IMPAIRED MOBILITY AND ENDURANCE: Status: ACTIVE | Noted: 2020-02-24

## 2020-06-11 PROBLEM — R51.9 HEADACHE: Status: ACTIVE | Noted: 2019-05-07

## 2020-06-11 PROBLEM — K74.00: Status: ACTIVE | Noted: 2019-11-21

## 2020-06-11 PROBLEM — M62.81 GENERALIZED MUSCLE WEAKNESS: Status: ACTIVE | Noted: 2020-02-24

## 2020-06-11 PROBLEM — K76.9 PLEURAL EFFUSION ASSOCIATED WITH HEPATIC DISORDER: Status: ACTIVE | Noted: 2019-12-02

## 2020-06-11 PROBLEM — J91.8 PLEURAL EFFUSION ASSOCIATED WITH HEPATIC DISORDER: Status: ACTIVE | Noted: 2019-12-02

## 2020-06-11 PROBLEM — R26.89 BALANCE PROBLEMS: Status: ACTIVE | Noted: 2020-02-24

## 2020-06-11 PROBLEM — L03.115 CELLULITIS OF RIGHT LOWER EXTREMITY: Status: ACTIVE | Noted: 2020-04-23

## 2020-06-11 PROBLEM — K43.2 INCISIONAL HERNIA, WITHOUT OBSTRUCTION OR GANGRENE: Status: ACTIVE | Noted: 2019-10-09

## 2020-06-11 PROBLEM — N17.9 ACUTE RENAL FAILURE (ARF) (HCC): Status: ACTIVE | Noted: 2019-06-17

## 2020-06-11 PROBLEM — J90 PLEURAL EFFUSION, NOT ELSEWHERE CLASSIFIED: Status: ACTIVE | Noted: 2019-11-08

## 2020-06-11 PROBLEM — R87.610 ATYPICAL SQUAMOUS CELL CHANGES OF UNDETERMINED SIGNIFICANCE (ASCUS) ON CERVICAL CYTOLOGY WITH POSITIVE HIGH RISK HUMAN PAPILLOMA VIRUS (HPV): Status: ACTIVE | Noted: 2020-04-21

## 2020-06-11 PROBLEM — T86.49: Status: ACTIVE | Noted: 2019-11-21

## 2020-06-11 PROBLEM — F43.20 ADJUSTMENT DISORDER IN REMISSION: Status: ACTIVE | Noted: 2020-02-28

## 2020-06-11 PROBLEM — T78.1XXA GASTROINTESTINAL FOOD SENSITIVITY: Status: ACTIVE | Noted: 2018-06-11

## 2020-06-11 PROBLEM — R18.8 ABDOMINAL ASCITES: Status: ACTIVE | Noted: 2019-06-12

## 2020-06-11 PROBLEM — K42.0 INCARCERATED UMBILICAL HERNIA: Status: ACTIVE | Noted: 2020-04-27

## 2020-06-11 PROBLEM — R87.810 ATYPICAL SQUAMOUS CELL CHANGES OF UNDETERMINED SIGNIFICANCE (ASCUS) ON CERVICAL CYTOLOGY WITH POSITIVE HIGH RISK HUMAN PAPILLOMA VIRUS (HPV): Status: ACTIVE | Noted: 2020-04-21

## 2020-06-11 PROBLEM — L29.9 PRURITUS: Status: ACTIVE | Noted: 2019-07-04

## 2020-06-11 PROBLEM — E46 MALNUTRITION (HCC): Status: ACTIVE | Noted: 2019-07-01

## 2020-06-11 PROBLEM — G25.81 RESTLESS LEG SYNDROME: Status: ACTIVE | Noted: 2019-07-07

## 2020-06-18 PROBLEM — Z01.818 PRE-OP EXAM: Status: RESOLVED | Noted: 2020-05-19 | Resolved: 2020-06-18

## 2022-03-18 PROBLEM — Z29.8 NEED FOR PROPHYLACTIC IMMUNOTHERAPY: Status: ACTIVE | Noted: 2017-08-08

## 2022-03-18 PROBLEM — J90 PLEURAL EFFUSION, NOT ELSEWHERE CLASSIFIED: Status: ACTIVE | Noted: 2019-11-08

## 2022-03-18 PROBLEM — Z94.4 LIVER REPLACED BY TRANSPLANT (HCC): Status: ACTIVE | Noted: 2017-08-08

## 2022-03-18 PROBLEM — L03.115 CELLULITIS OF RIGHT LOWER EXTREMITY: Status: ACTIVE | Noted: 2020-04-23

## 2022-03-18 PROBLEM — L29.9 PRURITUS: Status: ACTIVE | Noted: 2019-07-04

## 2022-03-18 PROBLEM — Z74.09 IMPAIRED MOBILITY AND ENDURANCE: Status: ACTIVE | Noted: 2020-02-24

## 2022-03-18 PROBLEM — T78.1XXA GASTROINTESTINAL FOOD SENSITIVITY: Status: ACTIVE | Noted: 2018-06-11

## 2022-03-19 PROBLEM — E46 MALNUTRITION (HCC): Status: ACTIVE | Noted: 2019-07-01

## 2022-03-19 PROBLEM — E06.3 HASHIMOTO'S THYROIDITIS: Status: ACTIVE | Noted: 2017-08-08

## 2022-03-19 PROBLEM — K76.9 PLEURAL EFFUSION ASSOCIATED WITH HEPATIC DISORDER: Status: ACTIVE | Noted: 2019-12-02

## 2022-03-19 PROBLEM — I95.9 HYPOTENSION: Status: ACTIVE | Noted: 2019-10-16

## 2022-03-19 PROBLEM — T86.49: Status: ACTIVE | Noted: 2019-11-21

## 2022-03-19 PROBLEM — R26.89 BALANCE PROBLEMS: Status: ACTIVE | Noted: 2020-02-24

## 2022-03-19 PROBLEM — R87.810 ATYPICAL SQUAMOUS CELL CHANGES OF UNDETERMINED SIGNIFICANCE (ASCUS) ON CERVICAL CYTOLOGY WITH POSITIVE HIGH RISK HUMAN PAPILLOMA VIRUS (HPV): Status: ACTIVE | Noted: 2020-04-21

## 2022-03-19 PROBLEM — R51.9 HEADACHE: Status: ACTIVE | Noted: 2019-05-07

## 2022-03-19 PROBLEM — N17.9 ACUTE RENAL FAILURE (ARF) (HCC): Status: ACTIVE | Noted: 2019-06-17

## 2022-03-19 PROBLEM — R18.8 ABDOMINAL ASCITES: Status: ACTIVE | Noted: 2019-06-12

## 2022-03-19 PROBLEM — J91.8 PLEURAL EFFUSION ASSOCIATED WITH HEPATIC DISORDER: Status: ACTIVE | Noted: 2019-12-02

## 2022-03-19 PROBLEM — M62.81 GENERALIZED MUSCLE WEAKNESS: Status: ACTIVE | Noted: 2020-02-24

## 2022-03-19 PROBLEM — K74.00: Status: ACTIVE | Noted: 2019-11-21

## 2022-03-19 PROBLEM — F43.20 ADJUSTMENT DISORDER IN REMISSION: Status: ACTIVE | Noted: 2020-02-28

## 2022-03-19 PROBLEM — E16.2 HYPOGLYCEMIA: Status: ACTIVE | Noted: 2019-10-16

## 2022-03-19 PROBLEM — R87.610 ATYPICAL SQUAMOUS CELL CHANGES OF UNDETERMINED SIGNIFICANCE (ASCUS) ON CERVICAL CYTOLOGY WITH POSITIVE HIGH RISK HUMAN PAPILLOMA VIRUS (HPV): Status: ACTIVE | Noted: 2020-04-21

## 2022-03-19 PROBLEM — G25.81 RESTLESS LEG SYNDROME: Status: ACTIVE | Noted: 2019-07-07

## 2022-03-20 PROBLEM — K42.0 INCARCERATED UMBILICAL HERNIA: Status: ACTIVE | Noted: 2020-04-27

## 2022-03-20 PROBLEM — K43.2 INCISIONAL HERNIA, WITHOUT OBSTRUCTION OR GANGRENE: Status: ACTIVE | Noted: 2019-10-09

## 2023-05-10 RX ORDER — HYDROXYZINE HYDROCHLORIDE 25 MG/1
TABLET, FILM COATED ORAL 3 TIMES DAILY PRN
COMMUNITY

## 2023-05-10 RX ORDER — CHOLECALCIFEROL (VITAMIN D3) 125 MCG
CAPSULE ORAL
COMMUNITY

## 2023-05-10 RX ORDER — MIDODRINE HYDROCHLORIDE 10 MG/1
10 TABLET ORAL 3 TIMES DAILY
COMMUNITY

## 2023-05-10 RX ORDER — UREA 10 %
220 LOTION (ML) TOPICAL 2 TIMES DAILY
COMMUNITY

## 2023-05-10 RX ORDER — LEVOTHYROXINE SODIUM 175 UG/1
175 TABLET ORAL
COMMUNITY

## 2023-05-10 RX ORDER — POTASSIUM CHLORIDE 750 MG/1
10 TABLET, FILM COATED, EXTENDED RELEASE ORAL DAILY
COMMUNITY

## 2023-05-10 RX ORDER — ROPINIROLE 0.25 MG/1
TABLET, FILM COATED ORAL 3 TIMES DAILY PRN
COMMUNITY

## 2023-05-10 RX ORDER — MYCOPHENOLATE MOFETIL 500 MG/1
250 TABLET ORAL 2 TIMES DAILY
COMMUNITY